# Patient Record
Sex: FEMALE | Race: WHITE | Employment: FULL TIME | URBAN - METROPOLITAN AREA
[De-identification: names, ages, dates, MRNs, and addresses within clinical notes are randomized per-mention and may not be internally consistent; named-entity substitution may affect disease eponyms.]

---

## 2018-11-01 ENCOUNTER — OFFICE VISIT (OUTPATIENT)
Dept: GERIATRIC MEDICINE | Age: 58
End: 2018-11-01

## 2018-11-01 VITALS
RESPIRATION RATE: 18 BRPM | DIASTOLIC BLOOD PRESSURE: 84 MMHG | OXYGEN SATURATION: 100 % | TEMPERATURE: 99.2 F | HEART RATE: 86 BPM | SYSTOLIC BLOOD PRESSURE: 112 MMHG

## 2018-11-01 DIAGNOSIS — E16.2: ICD-10-CM

## 2018-11-01 DIAGNOSIS — Z98.890: ICD-10-CM

## 2018-11-01 DIAGNOSIS — R42 EPISODE OF DIZZINESS: Primary | ICD-10-CM

## 2018-11-01 DIAGNOSIS — F41.9 ANXIETY: ICD-10-CM

## 2018-11-01 DIAGNOSIS — E16.2 HYPOGLYCEMIA: ICD-10-CM

## 2018-11-01 DIAGNOSIS — E86.0 DEHYDRATION: ICD-10-CM

## 2018-11-01 PROBLEM — F41.1 GENERALIZED ANXIETY DISORDER: Chronic | Status: ACTIVE | Noted: 2018-11-01

## 2018-11-01 LAB — GLUCOSE POC: 84 MG/DL

## 2018-11-01 RX ORDER — DEXTROSE 50 % IN WATER (D50W) INTRAVENOUS SYRINGE
25
Qty: 50 ML | Refills: 0
Start: 2018-11-01 | End: 2018-11-01

## 2018-11-01 RX ORDER — CITALOPRAM 10 MG/1
TABLET ORAL DAILY
COMMUNITY

## 2018-11-01 RX ORDER — HYDROGEN PEROXIDE 3 %
SOLUTION, NON-ORAL MISCELLANEOUS DAILY
COMMUNITY

## 2018-11-01 RX ORDER — SODIUM CHLORIDE 9 MG/ML
1000 INJECTION, SOLUTION INTRAVENOUS
Qty: 1000 ML | Refills: 0
Start: 2018-11-01 | End: 2018-11-01

## 2018-11-01 RX ORDER — ESTRADIOL 2 MG/1
TABLET ORAL DAILY
COMMUNITY
End: 2022-09-29

## 2018-11-01 NOTE — PATIENT INSTRUCTIONS
Dehydration: Care Instructions  Your Care Instructions  Dehydration happens when your body loses too much fluid. This might happen when you do not drink enough water or you lose large amounts of fluids from your body because of diarrhea, vomiting, or sweating. Severe dehydration can be life-threatening. Water and minerals called electrolytes help put your body fluids back in balance. Learn the early signs of fluid loss, and drink more fluids to prevent dehydration. Follow-up care is a key part of your treatment and safety. Be sure to make and go to all appointments, and call your doctor if you are having problems. It's also a good idea to know your test results and keep a list of the medicines you take. How can you care for yourself at home? · To prevent dehydration, drink plenty of fluids, enough so that your urine is light yellow or clear like water. Choose water and other caffeine-free clear liquids until you feel better. If you have kidney, heart, or liver disease and have to limit fluids, talk with your doctor before you increase the amount of fluids you drink. · If you do not feel like eating or drinking, try taking small sips of water, sports drinks, or other rehydration drinks. · Get plenty of rest.  To prevent dehydration  · Add more fluids to your diet and daily routine, unless your doctor has told you not to. · During hot weather, drink more fluids. Drink even more fluids if you exercise a lot. Stay away from drinks with alcohol or caffeine. · Watch for the symptoms of dehydration. These include:  ? A dry, sticky mouth. ? Dark yellow urine, and not much of it. ? Dry and sunken eyes. ? Feeling very tired. · Learn what problems can lead to dehydration. These include:  ? Diarrhea, fever, and vomiting. ? Any illness with a fever, such as pneumonia or the flu. ? Activities that cause heavy sweating, such as endurance races and heavy outdoor work in hot or humid weather. ?  Alcohol or drug abuse or withdrawal.  ? Certain medicines, such as cold and allergy pills (antihistamines), diet pills (diuretics), and laxatives. ? Certain diseases, such as diabetes, cancer, and heart or kidney disease. When should you call for help? Call 911 anytime you think you may need emergency care. For example, call if:    · You passed out (lost consciousness).    Call your doctor now or seek immediate medical care if:    · You are confused and cannot think clearly.     · You are dizzy or lightheaded, or you feel like you may faint.     · You have signs of needing more fluids. You have sunken eyes and a dry mouth, and you pass only a little dark urine.     · You cannot keep fluids down.    Watch closely for changes in your health, and be sure to contact your doctor if:    · You are not making tears.     · Your skin is very dry and sags slowly back into place after you pinch it.     · Your mouth and eyes are very dry. Where can you learn more? Go to http://bradley-michelle.info/. Enter E900 in the search box to learn more about \"Dehydration: Care Instructions. \"  Current as of: November 20, 2017  Content Version: 11.8  © 4336-4093 [employer]. Care instructions adapted under license by [employer] (which disclaims liability or warranty for this information). If you have questions about a medical condition or this instruction, always ask your healthcare professional. [place] [address] any warranty or liability for your use of this information. Learning About Low Blood Sugar (Hypoglycemia) in Diabetes  What is low blood sugar (hypoglycemia)? Hypoglycemia means that your blood sugar is low and your body (especially your brain) is not getting enough fuel. If you have diabetes, your blood sugar can go too low if you take too much of some diabetes medicines.  It can also go too low if you miss a meal. And it can happen if you exercise too hard without eating enough food. Some medicines used to treat other health problems can cause low blood sugar too. What are the symptoms? Symptoms of low blood sugar can start quickly. It may take just 10 to 15 minutes. If you have had diabetes for many years, you may not realize that your blood sugar is low until it drops very low. · If your blood sugar level drops below 70 (mild low blood sugar), you may feel tired, anxious, dizzy, weak, shaky, or sweaty. You may have a fast heartbeat or blurry vision. · If your blood sugar level continues to drop (usually below 40), your behavior may change. You may feel more irritable. You may find it hard to concentrate or talk. And you may feel unsteady when you stand or walk. You may become too weak or confused to eat something with sugar to raise your blood sugar level. · If your blood sugar level drops very low (usually below 20), you may pass out (lose consciousness). Or you may have a seizure or stroke. If you have symptoms of severe low blood sugar, you need to get medical care right away. If you had a low blood sugar level during the night, you may wake up tired or with a headache. Or you may sweat so much during the night that your pajamas or sheets are damp when you wake up. How is low blood sugar treated? You can treat low blood sugar by eating or drinking something that has 15 grams of carbohydrate. These should be quick-sugar foods. Check your blood sugar level again 15 minutes after having a quick-sugar food to make sure your level is getting back to your target range.   Here are examples of quick-sugar foods that have 15 grams of carbohydrate:  · 3 to 4 glucose tablets  · 1 tube of glucose gel  · Hard candy (such as 3 Jolly Ranchers or 5 to 7 Life Savers)  · 1 tablespoon honey  · 2 tablespoons of raisins  · ½ cup to ¾ cup (4 to 6 ounces) of fruit juice or regular (not diet) soda  · 1 tablespoon of sugar  · 1 cup of fat-free milk  If you have problems with severe low blood sugar, someone else may have to give you a shot of glucagon. This is a hormone that raises blood sugar levels quickly. How can you prevent low blood sugar? You can take steps to prevent low blood sugar. · Follow your treatment plan. Take your insulin or other diabetes medicine exactly as your doctor prescribed it. Talk with your doctor if you're having low blood sugar often. Your medicine may need to be adjusted if it's causing your low blood sugar. · Check your blood sugar levels often. This helps you find early changes before an emergency happens. · Keep a quick-sugar food with you in case your blood sugar level drops low. · Eat small meals more often so that you don't get too hungry between meals. Don't skip meals. · Balance extra exercise with eating more. Check your blood sugar and learn how it changes after exercise. If your blood sugar stays at a normal level, you may not need to eat after you exercise. · Limit how much alcohol you drink. Alcohol can make low blood sugar go even lower. Don't drink alcohol if you have problems recognizing the early signs of low blood sugar. · Keep a diary of your symptoms. This helps you learn when changes in your body may signal low blood sugar. And keep track of how often you have low blood sugar, including when you last ate and what you ate. This will help you learn what causes your blood sugar to drop. · Learn about diabetes and low blood sugar. Support groups or a diabetes education center can help you understand how medicines, diet, and exercise affect your blood sugar levels. Since low blood sugar levels can quickly become an emergency, be sure to wear medical alert jewelry, such as a medical alert bracelet. This is to let people know you have diabetes so they can get help for you. You can buy this at most drugstores. And make sure your family, friends, and coworkers know the symptoms of low blood sugar.  Teach them what to do to get your sugar level up.  Follow-up care is a key part of your treatment and safety. Be sure to make and go to all appointments, and call your doctor if you are having problems. It's also a good idea to know your test results and keep a list of the medicines you take. Where can you learn more? Go to http://bradley-michelle.info/. Enter U049 in the search box to learn more about \"Learning About Low Blood Sugar (Hypoglycemia) in Diabetes. \"  Current as of: December 7, 2017  Content Version: 11.8  © 4344-3968 [employer]. Care instructions adapted under license by [employer] (which disclaims liability or warranty for this information). If you have questions about a medical condition or this instruction, always ask your healthcare professional. [address] any warranty or liability for your use of this information. Lightheadedness or Faintness: Care Instructions  Your Care Instructions  Lightheadedness is a feeling that you are about to faint or \"pass out. \" You do not feel as if you or your surroundings are moving. It is different from vertigo, which is the feeling that you or things around you are spinning or tilting. Lightheadedness usually goes away or gets better when you lie down. If lightheadedness gets worse, it can lead to a fainting spell. It is common to feel lightheaded from time to time. Lightheadedness usually is not caused by a serious problem. It often is caused by a short-lasting drop in blood pressure and blood flow to your head that occurs when you get up too quickly from a seated or lying position. Follow-up care is a key part of your treatment and safety. Be sure to make and go to all appointments, and call your doctor if you are having problems. It's also a good idea to know your test results and keep a list of the medicines you take. How can you care for yourself at home? · Lie down for 1 or 2 minutes when you feel lightheaded.  After lying down, sit up slowly and remain sitting for 1 to 2 minutes before slowly standing up. · Avoid movements, positions, or activities that have made you lightheaded in the past.  · Get plenty of rest, especially if you have a cold or flu, which can cause lightheadedness. · Make sure you drink plenty of fluids, especially if you have a fever or have been sweating. · Do not drive or put yourself and others in danger while you feel lightheaded. When should you call for help? Call 911 anytime you think you may need emergency care. For example, call if:    · You have symptoms of a stroke. These may include:  ? Sudden numbness, tingling, weakness, or loss of movement in your face, arm, or leg, especially on only one side of your body. ? Sudden vision changes. ? Sudden trouble speaking. ? Sudden confusion or trouble understanding simple statements. ? Sudden problems with walking or balance. ? A sudden, severe headache that is different from past headaches.     · You have symptoms of a heart attack. These may include:  ? Chest pain or pressure, or a strange feeling in the chest.  ? Sweating. ? Shortness of breath. ? Nausea or vomiting. ? Pain, pressure, or a strange feeling in the back, neck, jaw, or upper belly or in one or both shoulders or arms. ? Lightheadedness or sudden weakness. ? A fast or irregular heartbeat. After you call 911, the  may tell you to chew 1 adult-strength or 2 to 4 low-dose aspirin. Wait for an ambulance. Do not try to drive yourself.    Watch closely for changes in your health, and be sure to contact your doctor if:    · Your lightheadedness gets worse or does not get better with home care. Where can you learn more? Go to http://bradley-michelle.info/. Enter V664 in the search box to learn more about \"Lightheadedness or Faintness: Care Instructions. \"  Current as of: November 20, 2017  Content Version: 11.8  © 5526-6596 [employer].  Care instructions adapted under license by [employer] (which disclaims liability or warranty for this information). If you have questions about a medical condition or this instruction, always ask your healthcare professional. [address] any warranty or liability for your use of this information.

## 2018-11-01 NOTE — PROGRESS NOTES
Reason for Visit/HPI:    Ольга Cruz is a 63 y.o. female patient who presents today for   Chief Complaint   Patient presents with    Visual Problems     Patient here for vision changes, She states she had upper and lower GI yesturday. She did receive fluids for dehydration yesturday. This morning she started with the vision changes and feeling of nervous, shaking. Diagnosis/Treatment Plan:    Diagnoses and all orders for this visit:    1. Episode of dizziness  -     0.9% sodium chloride solution; 1,000 mL by IntraVENous route now for 1 dose. -     AMB POC GLUCOSE BLOOD, BY GLUCOSE MONITORING DEVICE  -     IV INFUSION, HYDRATION, 1ST HOUR  -     dextrose (D50W) 50% solution; 50 mL by IntraVENous route now for 1 dose. -     ND THER/PROPH/DIAG INJECTION, IV PUSH, SINGLE  -     INSERT PERIPHERAL IV    2. Hypoglycemia ; Mrs. Kameron Harrington is 1 day post op EGD/COLON procedure with the [facility] for screening colonoscopy and EGD for GERD evaluation. Mrs. Kameron Harrington presents today in a panic response. She is stating \"I feel like I am going to die\", \" I am going to pass out\". She was panicking and hyperventilating when I came into the room. Her vitals were elevated and she was breathing 24 to 28 bpm. I had her lie down and following exam it is noted that her sugar is 84 on our in house monitor. She has been NPO for a few days due the colonoscopy. She also reports she had a rough recovery from the procedures and required extra time and fluids to wake up and be released. As far as she and her  know there were not complications from the procedures. Vitals are stable but elevated. I ordered and placed 18 g IV C in the right AC, started bolus of Normal Saline, gave 1 tube of oral glucagon without response, I then her sugar was still not responding. I pushed Dextrose IV 50% total of 50 mls. Approximately 5-7 min's later her shaking at stopped, she was calm, and making sentences.  500 ml's of normal saline was infused and she was assessed for safe ambulation. She went to the bathroom with her  and had a bowel movement without any signs of blood or trauma as well as she urinated well. She reported her dizziness, tremors, weakness, and panic was resolving well. I advised she should get the other 500 mls for a total 1000 mls for hydration. Vital signs were reacessed after the entire 1000 mls. She was stable on her feet, her tongue was wet and glistening. Rechecked her FS BS noted to be 88 on our monitor. Concered the monitor was not correct to start with. Discharged her home for the day with her  to rest up. Educated on hypoglycemia and the signs and symptoms.     -     0.9% sodium chloride solution; 1,000 mL by IntraVENous route now for 1 dose. -     AMB POC GLUCOSE BLOOD, BY GLUCOSE MONITORING DEVICE  -     IV INFUSION, HYDRATION, 1ST HOUR  -     dextrose (D50W) 50% solution; 50 mL by IntraVENous route now for 1 dose. -     NC THER/PROPH/DIAG INJECTION, IV PUSH, SINGLE  -     INSERT PERIPHERAL IV    3. Dehydration  -     0.9% sodium chloride solution; 1,000 mL by IntraVENous route now for 1 dose. -     AMB POC GLUCOSE BLOOD, BY GLUCOSE MONITORING DEVICE  -     IV INFUSION, HYDRATION, 1ST HOUR  -     dextrose (D50W) 50% solution; 50 mL by IntraVENous route now for 1 dose. -     NC THER/PROPH/DIAG INJECTION, IV PUSH, SINGLE  -     INSERT PERIPHERAL IV    4. Transient hypoglycemia post procedure    5. Anxiety  -     0.9% sodium chloride solution; 1,000 mL by IntraVENous route now for 1 dose. -     AMB POC GLUCOSE BLOOD, BY GLUCOSE MONITORING DEVICE  -     IV INFUSION, HYDRATION, 1ST HOUR  -     dextrose (D50W) 50% solution; 50 mL by IntraVENous route now for 1 dose. -     NC THER/PROPH/DIAG INJECTION, IV PUSH, SINGLE  -     INSERT PERIPHERAL IV    Discontinued Care:     The following treatment modalities have been discontinued by the provider today:   There are no discontinued medications. Follow Up: Follow-up Disposition:  Return if symptoms worsen or fail to improve. Subjective:   No Known Allergies  Prior to Admission medications    Medication Sig Start Date End Date Taking? Authorizing Provider   estradiol (ESTRACE) 2 mg tablet Take  by mouth daily. Yes Provider, Historical   citalopram (CELEXA) 10 mg tablet Take  by mouth daily. Yes Provider, Historical   esomeprazole (NEXIUM) 20 mg capsule Take  by mouth daily. Yes Provider, Historical   0.9% sodium chloride solution 1,000 mL by IntraVENous route now for 1 dose. 11/1/18 11/1/18 Yes Estrella Isaac NP   dextrose (D50W) 50% solution 50 mL by IntraVENous route now for 1 dose. 11/1/18 11/1/18 Yes Estrella Isaac NP     Past Medical History:   Diagnosis Date    GERD (gastroesophageal reflux disease)      Past Surgical History:   Procedure Laterality Date    HX COLONOSCOPY  10/31/2018    HX ENDOSCOPY  10/31/2018    HX HYSTERECTOMY        Social History     Tobacco Use    Smoking status: Never Smoker    Smokeless tobacco: Never Used   Substance Use Topics    Alcohol use: Yes     Alcohol/week: 1.2 oz     Types: 2 Glasses of wine per week     Frequency: Never    Drug use: No      History reviewed. No pertinent family history. No flowsheet data found.   Test Results:   No results found for: WBC, WBCLT, HGBPOC, HGB, HGBP, HCTPOC, HCT, PHCT, RBCH, PLT, MCV, HGBEXT, HCTEXT, PLTEXT  No results found for: NA, K, CL, CO2, AGAP, GLU, BUN, CREA, BUCR, GFRAA, GFRNA, CA, TBIL, TBILI, GPT, SGOT, AP, TP, ALB, GLOB, AGRAT, ALT    Objective:     Vitals:    11/01/18 0957 11/01/18 1003 11/01/18 1045   BP: 121/82 140/90 112/84   Pulse: 88  86   Resp: 22  18   Temp: 98.8 °F (37.1 °C)  99.2 °F (37.3 °C)   TempSrc: Oral  Tympanic   SpO2: 100%  100%     Wt Readings from Last 3 Encounters:   No data found for Wt     BP Readings from Last 3 Encounters:   11/01/18 112/84     Review of Systems   Constitutional: Positive for activity change, appetite change, chills and fatigue. Negative for fever. HENT: Positive for trouble swallowing. Negative for congestion, dental problem, hearing loss, postnasal drip, sinus pressure, sneezing and sore throat. Eyes: Positive for photophobia and visual disturbance. Negative for discharge and redness. Respiratory: Positive for chest tightness and shortness of breath. Negative for apnea, cough and wheezing. Cardiovascular: Positive for chest pain and palpitations. Negative for leg swelling. Gastrointestinal: Negative for abdominal distention, abdominal pain, blood in stool, constipation, diarrhea, nausea and vomiting. Endocrine: Negative for polydipsia, polyphagia and polyuria. Genitourinary: Negative for flank pain, frequency and urgency. Musculoskeletal: Negative for arthralgias, gait problem, joint swelling and neck pain. Skin: Positive for pallor. Negative for color change, rash and wound. Allergic/Immunologic: Negative for environmental allergies and food allergies. Neurological: Positive for dizziness, tremors, weakness, light-headedness, numbness and headaches. Hematological: Negative for adenopathy. Psychiatric/Behavioral: Positive for agitation and confusion. Negative for sleep disturbance. The patient is nervous/anxious and is hyperactive. Physical Exam   Constitutional: She is oriented to person, place, and time. She appears dehydrated. She appears to not be writhing in pain and not malnourished. She appears unhealthy. She does not have a sickly appearance. She appears distressed. Alert, frantic, panicked, hyperventilating, anxious, making statements of \"impending doom\", \"I am going to pass out\". HENT:   Head: Normocephalic and atraumatic.    Right Ear: Hearing, tympanic membrane, external ear and ear canal normal.   Left Ear: Hearing, tympanic membrane, external ear and ear canal normal.   Nose: Nose normal.   Mouth/Throat: Uvula is midline and oropharynx is clear and moist. Mucous membranes are not pale, dry and not cyanotic. No oral lesions. No uvula swelling. No posterior oropharyngeal edema or posterior oropharyngeal erythema. Dry, furrowed, tachy. Eyes: Conjunctivae, EOM and lids are normal. Pupils are equal, round, and reactive to light. Neck: Trachea normal, normal range of motion and full passive range of motion without pain. Neck supple. Normal carotid pulses, no hepatojugular reflux and no JVD present. No thyromegaly present. Cardiovascular: S1 normal, S2 normal, normal heart sounds, intact distal pulses and normal pulses. An irregular rhythm present. Occasional extrasystoles are present. Tachycardia present. Exam reveals no gallop and no friction rub. No murmur heard. 1 + non pitting edema in both lower extremities. Pulmonary/Chest: Effort normal and breath sounds normal. Tachypnea noted. Breathing fast, irregular, and almost hyperventilating. Abdominal: Soft. Normal appearance and bowel sounds are normal. There is no hepatosplenomegaly. There is no tenderness. There is no CVA tenderness. Musculoskeletal:   Generalized chronic extremity weakness is present. Lymphadenopathy:        Head (right side): Submental, submandibular and tonsillar adenopathy present. Head (left side): Submental, submandibular and tonsillar adenopathy present. Neurological: She is alert and oriented to person, place, and time. She has normal reflexes and intact cranial nerves. She is agitated. She displays weakness, abnormal stance and abnormal speech. A sensory deficit is present. She exhibits normal muscle tone. Coordination and gait abnormal. GCS score is 15. Skin: Skin is warm and intact. No bruising and no rash noted. She is diaphoretic. No cyanosis. There is pallor. Nails show no clubbing. Psychiatric: Memory normal. Her mood appears anxious. She is agitated. She expresses impulsivity. She exhibits disordered thought content.    Baseline mood and affect unchanged from normal.       Disclaimer:   Ms. Susannah Nelson has been advised to call or return to our office if symptoms worsen/change/persist. We as a care team including the patient; discussed expected course/resolution/complications of diagnosis in detail today. Medication risks/benefits/costs/interactions/alternatives discussed Susannah Nelson was given an after visit summary which includes diagnoses, current medications, & vitals. Susannah Nelson expressed understanding with the diagnosis and plan.

## 2018-11-01 NOTE — PROGRESS NOTES
ADVISED PATIENT OF THE FOLLOWING HEALTH MAINTAINCE DUE  Health Maintenance Due   Topic Date Due    DTaP/Tdap/Td series (1 - Tdap) 05/24/1982    PAP AKA CERVICAL CYTOLOGY  05/24/1982    Shingrix Vaccine Age 50> (1 of 2) 05/24/2011    BREAST CANCER SCRN MAMMOGRAM  05/24/2011    FOBT Q 1 YEAR AGE 50-75  05/24/2011    Influenza Age 5 to Adult  08/01/2018      Chief Complaint   Patient presents with    Visual Problems     Patient here for vision changes, She states she had upper and lower GI yesturday. She did receive fluids for dehydration yesturday. This morning she started with the vision changes and feeling of nervous, shaking. 1. Have you been to the ER, urgent care clinic since your last visit? Hospitalized since your last visit? No    2. Have you seen or consulted any other health care providers outside of the [address] [place] [address] since your last visit? Include any DEXA scan, mammography  or colon screening. Yes, had upper and lower GI on yesturday. 10/31/18    3. Do you have an Advance Directive on file? no    4. Do you have a DNR on file?  Full        Patient is accompanied by self and  I have received verbal consent from Sivan Watters to discuss any/all medical information while they are present in the [facility]

## 2018-12-17 ENCOUNTER — OFFICE VISIT (OUTPATIENT)
Dept: GERIATRIC MEDICINE | Age: 58
End: 2018-12-17

## 2018-12-17 VITALS
OXYGEN SATURATION: 100 % | RESPIRATION RATE: 18 BRPM | DIASTOLIC BLOOD PRESSURE: 74 MMHG | SYSTOLIC BLOOD PRESSURE: 112 MMHG | WEIGHT: 150 LBS | HEART RATE: 83 BPM | BODY MASS INDEX: 24.99 KG/M2 | HEIGHT: 65 IN

## 2018-12-17 DIAGNOSIS — R19.8 ABDOMINAL FULLNESS IN LEFT LOWER QUADRANT: ICD-10-CM

## 2018-12-17 DIAGNOSIS — E34.9 NON-MENOPAUSE HORMONAL DISORDER IN FEMALE: Chronic | ICD-10-CM

## 2018-12-17 DIAGNOSIS — N95.2 ATROPHIC VAGINITIS: Chronic | ICD-10-CM

## 2018-12-17 DIAGNOSIS — N32.89 BLADDER IRRITABILITY: ICD-10-CM

## 2018-12-17 DIAGNOSIS — N39.0 CHRONIC UTI (URINARY TRACT INFECTION): ICD-10-CM

## 2018-12-17 DIAGNOSIS — R35.0 URINARY FREQUENCY: Primary | ICD-10-CM

## 2018-12-17 PROBLEM — K58.2 IRRITABLE BOWEL SYNDROME WITH BOTH CONSTIPATION AND DIARRHEA: Chronic | Status: ACTIVE | Noted: 2018-12-17

## 2018-12-17 LAB
BILIRUB UR QL STRIP: NEGATIVE
GLUCOSE UR-MCNC: NEGATIVE MG/DL
KETONES P FAST UR STRIP-MCNC: NEGATIVE MG/DL
PH UR STRIP: 6 [PH] (ref 4.6–8)
PROT UR QL STRIP: NORMAL
SP GR UR STRIP: 1 (ref 1–1.03)
UA UROBILINOGEN AMB POC: NORMAL (ref 0.2–1)
URINALYSIS CLARITY POC: CLEAR
URINALYSIS COLOR POC: COLORLESS
URINE BLOOD POC: NEGATIVE
URINE LEUKOCYTES POC: NEGATIVE
URINE NITRITES POC: NEGATIVE

## 2018-12-17 RX ORDER — PHENAZOPYRIDINE HYDROCHLORIDE 100 MG/1
100 TABLET, FILM COATED ORAL
Qty: 9 TAB | Refills: 0 | Status: SHIPPED | OUTPATIENT
Start: 2018-12-17 | End: 2018-12-26

## 2018-12-17 RX ORDER — ESTRADIOL 10 UG/1
10 INSERT VAGINAL 2 TIMES WEEKLY
COMMUNITY
End: 2019-12-24

## 2018-12-17 RX ORDER — LORAZEPAM 0.5 MG/1
0.5 TABLET ORAL
Refills: 0 | COMMUNITY
Start: 2018-11-02 | End: 2018-12-27 | Stop reason: ALTCHOICE

## 2018-12-17 NOTE — PROGRESS NOTES
Reason for Visit/HPI:    Marcella Addison is a 63 y.o. female patient who presents today for   Chief Complaint   Patient presents with    Urinary Frequency     Being seen for UTI, on ABT about a month ago, now having frequency symptoms again. Diagnosis/Treatment Plan:    Diagnoses and all orders for this visit:    1. Urinary frequency  Comments:  Reports this has been ongoing for the last few months to a year. She has been on HRT for years. She just finished a course of ABX a month ago. Symptoms are back  Orders:  -     AMB POC URINALYSIS DIP STICK MANUAL W/O MICRO  -     conjugated estrogens (PREMARIN) 0.625 mg/gram vaginal cream; Apply 0.5 g to affected area daily.  -     UA/M W/RFLX CULTURE, ROUTINE  -     REFERRAL TO GYNECOLOGY  -     phenazopyridine (PYRIDIUM) 100 mg tablet; Take 1 Tab by mouth three (3) times daily as needed for Pain for up to 9 days. 2. Bladder irritability  Comments:  Chronic, ongoing bladder fullness, urgency, spasms. Orders:  -     REFERRAL TO GYNECOLOGY  -     phenazopyridine (PYRIDIUM) 100 mg tablet; Take 1 Tab by mouth three (3) times daily as needed for Pain for up to 9 days. 3. Abdominal fullness in left lower quadrant  Comments:  Mild lower left quad pain with these urinary frequencies. Orders:  -     conjugated estrogens (PREMARIN) 0.625 mg/gram vaginal cream; Apply 0.5 g to affected area daily.  -     UA/M W/RFLX CULTURE, ROUTINE  -     REFERRAL TO GYNECOLOGY  -     phenazopyridine (PYRIDIUM) 100 mg tablet; Take 1 Tab by mouth three (3) times daily as needed for Pain for up to 9 days. 4. Atrophic vaginitis  Comments:  H/o due to early hysterectomy in her 20's   Orders:  -     conjugated estrogens (PREMARIN) 0.625 mg/gram vaginal cream; Apply 0.5 g to affected area daily.  -     UA/M W/RFLX CULTURE, ROUTINE  -     REFERRAL TO GYNECOLOGY  -     phenazopyridine (PYRIDIUM) 100 mg tablet;  Take 1 Tab by mouth three (3) times daily as needed for Pain for up to 9 days.    5. Chronic UTI (urinary tract infection)  Comments:  On & off for the last months to a year. Has been treated in the last month with Macrobid. Symptoms are back minus burning this time. Orders:  -     REFERRAL TO GYNECOLOGY  -     phenazopyridine (PYRIDIUM) 100 mg tablet; Take 1 Tab by mouth three (3) times daily as needed for Pain for up to 9 days. 6. 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 HRT for years. Orders:  -     REFERRAL TO GYNECOLOGY  -     phenazopyridine (PYRIDIUM) 100 mg tablet; Take 1 Tab by mouth three (3) times daily as needed for Pain for up to 9 days. Discontinued Care: The following treatment modalities have been discontinued by the provider today:   There are no discontinued medications. Follow Up: Follow-up Disposition:  Return in about 1 week (around 12/24/2018), or if symptoms worsen or fail to improve. Subjective:   No Known Allergies  Prior to Admission medications    Medication Sig Start Date End Date Taking? Authorizing Provider   LORazepam (ATIVAN) 0.5 mg tablet Take 0.5 mg by mouth two (2) times daily as needed. 11/2/18  Yes Provider, Historical   estradiol (VAGIFEM) 10 mcg tab vaginal tablet Insert 10 mcg into vagina two (2) times a week. Yes Provider, Historical   conjugated estrogens (PREMARIN) 0.625 mg/gram vaginal cream Apply 0.5 g to affected area daily. 12/17/18  Yes Alana Webb NP   phenazopyridine (PYRIDIUM) 100 mg tablet Take 1 Tab by mouth three (3) times daily as needed for Pain for up to 9 days. 12/17/18 12/26/18 Yes Alana Webb, JUAN   estradiol (ESTRACE) 2 mg tablet Take  by mouth daily. Yes Provider, Historical   citalopram (CELEXA) 10 mg tablet Take  by mouth daily. Yes Provider, Historical   esomeprazole (NEXIUM) 20 mg capsule Take  by mouth daily.    Yes Provider, Historical     Past Medical History:   Diagnosis Date    GERD (gastroesophageal reflux disease)      Past Surgical History:   Procedure Laterality Date    HX COLONOSCOPY  10/31/2018    HX ENDOSCOPY  10/31/2018    HX HYSTERECTOMY        Social History     Tobacco Use    Smoking status: Never Smoker    Smokeless tobacco: Never Used   Substance Use Topics    Alcohol use: Yes     Alcohol/week: 1.2 oz     Types: 2 Glasses of wine per week     Frequency: Never    Drug use: No      No family history on file. Advance Care Planning 12/17/2018   Patient's Healthcare Decision Maker is: Verbal statement (Legal Next of Kin remains as decision maker)   Confirm Advance Directive None   Patient Would Like to Complete Advance Directive No     Test Results:     Office Visit on 12/17/2018   Component Date Value Ref Range Status    Color (UA POC) 12/17/2018 Colorless   Final    Clarity (UA POC) 12/17/2018 Clear   Final    Glucose (UA POC) 12/17/2018 Negative  Negative Final    Bilirubin (UA POC) 12/17/2018 Negative  Negative Final    Ketones (UA POC) 12/17/2018 Negative  Negative Final    Specific gravity (UA POC) 12/17/2018 1.005  1.001 - 1.035 Final    Blood (UA POC) 12/17/2018 Negative  Negative Final    pH (UA POC) 12/17/2018 6.0  4.6 - 8.0 Final    Protein (UA POC) 12/17/2018 1+  Negative Final    Urobilinogen (UA POC) 12/17/2018 normal  0.2 - 1 Final    Nitrites (UA POC) 12/17/2018 Negative  Negative Final    Leukocyte esterase (UA POC) 12/17/2018 Negative  Negative Final   Office Visit on 11/01/2018   Component Date Value Ref Range Status    Glucose POC 11/01/2018 84  mg/dL Final       Objective:     Vitals:    12/17/18 1533   BP: 112/74   Pulse: 83   Resp: 18   SpO2: 100%   Weight: 150 lb (68 kg)   Height: 5' 5\" (1.651 m)     Wt Readings from Last 3 Encounters:   12/17/18 150 lb (68 kg)     BP Readings from Last 3 Encounters:   12/17/18 112/74   11/01/18 112/84     Review of Systems   Constitutional: Negative for activity change, appetite change, chills, fatigue and fever.    HENT: Negative for congestion, dental problem, hearing loss, postnasal drip, sinus pressure, sneezing, sore throat and trouble swallowing. Eyes: Negative for discharge, redness and visual disturbance. Respiratory: Negative for apnea, cough, chest tightness, shortness of breath and wheezing. Cardiovascular: Negative for chest pain, palpitations and leg swelling. Gastrointestinal: Negative for abdominal distention, abdominal pain, blood in stool, constipation, diarrhea, nausea and vomiting. Endocrine: Positive for polyuria. Negative for polydipsia and polyphagia. Genitourinary: Positive for difficulty urinating, dysuria, flank pain, frequency, urgency and vaginal pain. Musculoskeletal: Negative for arthralgias, gait problem, joint swelling and neck pain. Skin: Negative for color change, pallor, rash and wound. Allergic/Immunologic: Negative for environmental allergies and food allergies. Neurological: Negative for dizziness, tremors, weakness, light-headedness, numbness and headaches. Hematological: Negative for adenopathy. Psychiatric/Behavioral: Negative for agitation, confusion and sleep disturbance. The patient is not nervous/anxious. Physical Exam   Constitutional: She is oriented to person, place, and time and well-developed, well-nourished, and in no distress. Vital signs are normal. She appears to not be writhing in pain, not malnourished and not dehydrated. She appears healthy. She does not have a sickly appearance. No distress. HENT:   Head: Normocephalic and atraumatic. Right Ear: Hearing, tympanic membrane, external ear and ear canal normal.   Left Ear: Hearing, tympanic membrane, external ear and ear canal normal.   Nose: Nose normal.   Mouth/Throat: Uvula is midline, oropharynx is clear and moist and mucous membranes are normal. Mucous membranes are not pale, not dry and not cyanotic. No oral lesions. No uvula swelling. No posterior oropharyngeal edema or posterior oropharyngeal erythema.    Eyes: Conjunctivae, EOM and lids are normal. Pupils are equal, round, and reactive to light. Lids are everted and swept, no foreign bodies found. Neck: Trachea normal, normal range of motion and full passive range of motion without pain. Neck supple. No hepatojugular reflux and no JVD present. Carotid bruit is not present. No thyromegaly present. Cardiovascular: Normal rate, regular rhythm, S1 normal, S2 normal, normal heart sounds, intact distal pulses and normal pulses. Occasional extrasystoles are present. Exam reveals no gallop and no friction rub. No murmur heard. No extremity edema is present during today's exam.    Pulmonary/Chest: Effort normal and breath sounds normal.   Abdominal: Soft. Normal appearance and bowel sounds are normal. There is no hepatosplenomegaly. There is no tenderness. There is no CVA tenderness. Neurological: She is alert and oriented to person, place, and time. She has normal sensation, normal strength, normal reflexes and intact cranial nerves. She displays no weakness. She exhibits normal muscle tone. Gait normal. Coordination and gait normal. GCS score is 15. Skin: Skin is warm, dry and intact. No bruising and no rash noted. She is not diaphoretic. No cyanosis. No pallor. Nails show no clubbing. Psychiatric: Mood, memory, affect and judgment normal.   Baseline mood and affect unchanged from normal.    Nursing note and vitals reviewed. Disclaimer:   Ms. Linda Philippe has been advised to call or return to our office if symptoms worsen/change/persist. We as a care team including the patient; discussed expected course/resolution/complications of diagnosis in detail today. Medication risks/benefits/costs/interactions/alternatives discussed Linda Philippe was given an after visit summary which includes diagnoses, current medications, & vitals. Linda Philippe expressed understanding with the diagnosis and plan.

## 2018-12-17 NOTE — PATIENT INSTRUCTIONS
Hormone Therapy (HT): Care Instructions  Your Care Instructions    Hormone therapy (HT) is medicine to treat symptoms of menopause, such as hot flashes, vaginal dryness, and sleep problems. It replaces the hormones that drop at menopause. Most women get relief from these symptoms within weeks of starting HT. HT contains two female hormones, estrogen and progestin. HT may come in the form of a pill, patch, gel, spray, or vaginal ring. A vaginal cream or a vaginal ring that has a much lower dose of estrogen may be used to relieve vaginal dryness only. HT has some risks. Most doctors recommend that women only take HT for as short a time as possible. This is to reduce the chances of heart disease, breast cancer, blood clots, and stroke that may be connected to HT. Be sure to have regular checkups with your doctor when taking HT. Talk with your doctor about whether HT is right for you. If you decide that the benefits of HT outweigh the risks, ask your doctor to prescribe the lowest effective dose for as short a time as possible. Follow-up care is a key part of your treatment and safety. Be sure to make and go to all appointments, and call your doctor if you are having problems. It's also a good idea to know your test results and keep a list of the medicines you take. Why might you take HT?  · HT reduces symptoms of menopause. These include hot flashes, mood swings, and sleep problems. · The estrogen in HT helps to prevent thinning bones. And it may lower the chance of colon cancer. · HT helps keep the lining of the vagina moist and thick. This can reduce irritation. · HT helps protect against dental problems, such as tooth loss and gum disease. What are the risks of taking HT? · Some women who take HT may have vaginal bleeding, bloating, nausea, sore breasts, mood swings, and headaches. Talk to your doctor about changing the type of HT you take or lowering the dose.  This may help to end these side effects. · Taking HT may slightly increase your risk for heart disease, breast cancer, ovarian cancer, blood clots, and stroke. · You should not take HT if you:  ? Could be pregnant. ? Have a personal history of breast cancer, endometrial cancer, pulmonary embolism, deep vein thrombosis, heart attack, or stroke. ? Have vaginal bleeding from an unknown cause. ? Have active liver disease. What can you do to reduce the symptoms of menopause? · Eat healthy foods and get regular exercise. This also will help to maintain strong bones and a healthy heart. · Do not smoke. If you smoke, you can reduce hot flashes and long-term health risks by stopping. If you need help quitting, talk to your doctor about stop-smoking programs and medicines. These can increase your chances of quitting for good. · Practice daily breathing exercises (meditation) to reduce hot flashes and mood swings. · Limit the amount of alcohol you drink. This can reduce symptoms of menopause and long-term health risks. · Keep your home and office cool. · Use a vaginal lubricant, such as Astroglide, Wet Gel Lubricant, or K-Y Jelly. · Do pelvic floor (Kegel) exercises, which tighten and strengthen pelvic muscles. To do Kegel exercises:  ? Squeeze the same muscles you would use to stop your urine. Your belly and thighs should not move. ? Hold the squeeze for 3 seconds, then relax for 3 seconds. ? Start with 3 seconds. Then add 1 second each week until you are able to squeeze for 10 seconds. ? Repeat the exercise 10 to 15 times a session. Do three or more sessions a day. Where can you learn more? Go to http://bradley-michelle.info/. Enter 002 [id number] in the search box to learn more about \"Hormone Therapy (HT): Care Instructions. \"  Current as of: May 15, 2018  Content Version: 11.8  © 7630-3016 [employer].  Care instructions adapted under license by [employer] (which disclaims liability or warranty for this information). If you have questions about a medical condition or this instruction, always ask your healthcare professional. [place] [address] any warranty or liability for your use of this information.

## 2018-12-17 NOTE — PROGRESS NOTES
ADVISED PATIENT OF THE FOLLOWING HEALTH MAINTAINCE DUE  Health Maintenance Due   Topic Date Due    Hepatitis C Screening  1960    DTaP/Tdap/Td series (1 - Tdap) 05/24/1982    PAP AKA CERVICAL CYTOLOGY  05/24/1982    Shingrix Vaccine Age 50> (1 of 2) 05/24/2011    BREAST CANCER SCRN MAMMOGRAM  05/24/2011    FOBT Q 1 YEAR AGE 50-75  05/24/2011      Chief Complaint   Patient presents with    Urinary Frequency     Being seen for UTI, on ABT about a month ago, now having frequency symptoms again. 1. Have you been to the ER, urgent care clinic since your last visit? Hospitalized since your last visit? Patient sees non [facility] providers. 2. Have you seen or consulted any other health care providers outside of the [address] [place] [address] since your last visit? Include any DEXA scan, mammography  or colon screening. Patient sees non [facility]. 3. Do you have an Advance Directive on file? no    4. Do you have a DNR on file? Full        Patient is accompanied by self I have received verbal consent from Alexandria Nicole to discuss any/all medical information while they are present in the room.   Advance Care Planning 12/17/2018   Patient's Healthcare Decision Maker is: Verbal statement (Legal Next of Kin remains as decision maker)   Confirm Advance Directive None   Patient Would Like to Complete Advance Directive No         [facility] [phone] - Hilaria Serna AT [address] [place] [id number]  Phone: [phone] Fax: [phone]    Results for orders placed or performed in visit on 12/17/18   AMB POC URINALYSIS DIP STICK MANUAL W/O MICRO   Result Value Ref Range    Color (UA POC) Colorless     Clarity (UA POC) Clear     Glucose (UA POC) Negative Negative    Bilirubin (UA POC) Negative Negative    Ketones (UA POC) Negative Negative    Specific gravity (UA POC) 1.005 1.001 - 1.035    Blood (UA POC) Negative Negative pH (UA POC) 6.0 4.6 - 8.0    Protein (UA POC) 1+ Negative    Urobilinogen (UA POC) normal 0.2 - 1    Nitrites (UA POC) Negative Negative    Leukocyte esterase (UA POC) Negative Negative

## 2018-12-19 ENCOUNTER — TELEPHONE (OUTPATIENT)
Dept: GERIATRIC MEDICINE | Age: 58
End: 2018-12-19

## 2018-12-19 DIAGNOSIS — N95.2 ATROPHIC VAGINITIS: Primary | Chronic | ICD-10-CM

## 2018-12-19 DIAGNOSIS — N30.00 ACUTE CYSTITIS WITHOUT HEMATURIA: ICD-10-CM

## 2018-12-19 RX ORDER — NITROFURANTOIN 25; 75 MG/1; MG/1
100 CAPSULE ORAL 2 TIMES DAILY
Qty: 20 CAP | Refills: 0 | Status: SHIPPED | OUTPATIENT
Start: 2018-12-19 | End: 2019-12-03

## 2018-12-19 NOTE — TELEPHONE ENCOUNTER
Mrs. Celio Calvert called to say her symptoms have gotten worse and now she is up going to bathroom hour after hour. Her urine sample has reflexed to a culture. I will order Medardo Jarquin for her to help with the symptoms. If the culture comes back with something else to treat I will change and advise. She has an appt with Dr. Guadarrama Courser for Jan. 9th. She also let me know she had not started the estrogen cream as it was out of stock.

## 2018-12-20 LAB
APPEARANCE UR: CLEAR
BACTERIA #/AREA URNS HPF: NORMAL /[HPF]
BACTERIA UR CULT: ABNORMAL
BILIRUB UR QL STRIP: NEGATIVE
CASTS URNS QL MICRO: NORMAL /LPF
COLOR UR: YELLOW
EPI CELLS #/AREA URNS HPF: NORMAL /HPF
GLUCOSE UR QL: NEGATIVE
HGB UR QL STRIP: NEGATIVE
KETONES UR QL STRIP: NEGATIVE
LEUKOCYTE ESTERASE UR QL STRIP: ABNORMAL
MICRO URNS: ABNORMAL
MUCOUS THREADS URNS QL MICRO: PRESENT
NITRITE UR QL STRIP: NEGATIVE
PH UR STRIP: 6.5 [PH] (ref 5–7.5)
PROT UR QL STRIP: NEGATIVE
RBC #/AREA URNS HPF: NORMAL /HPF
SP GR UR: 1.01 (ref 1–1.03)
URINALYSIS REFLEX, 377202: ABNORMAL
UROBILINOGEN UR STRIP-MCNC: 0.2 MG/DL (ref 0.2–1)
WBC #/AREA URNS HPF: NORMAL /HPF

## 2018-12-21 ENCOUNTER — CLINICAL SUPPORT (OUTPATIENT)
Dept: GERIATRIC MEDICINE | Age: 58
End: 2018-12-21

## 2018-12-21 DIAGNOSIS — B96.20 E-COLI UTI: ICD-10-CM

## 2018-12-21 DIAGNOSIS — B96.20 E-COLI UTI: Primary | ICD-10-CM

## 2018-12-21 DIAGNOSIS — R19.8 ABDOMINAL FULLNESS IN LEFT LOWER QUADRANT: ICD-10-CM

## 2018-12-21 DIAGNOSIS — R10.2 PELVIC PAIN IN FEMALE: ICD-10-CM

## 2018-12-21 DIAGNOSIS — N32.89 BLADDER IRRITABILITY: Primary | ICD-10-CM

## 2018-12-21 DIAGNOSIS — N39.0 E-COLI UTI: ICD-10-CM

## 2018-12-21 DIAGNOSIS — N39.0 CHRONIC UTI (URINARY TRACT INFECTION): ICD-10-CM

## 2018-12-21 DIAGNOSIS — M54.50 ACUTE BILATERAL LOW BACK PAIN WITHOUT SCIATICA: ICD-10-CM

## 2018-12-21 DIAGNOSIS — N39.0 E-COLI UTI: Primary | ICD-10-CM

## 2018-12-21 RX ORDER — ALBUTEROL SULFATE 90 UG/1
AEROSOL, METERED RESPIRATORY (INHALATION)
COMMUNITY
Start: 2018-03-27 | End: 2019-03-27

## 2018-12-21 RX ORDER — CEPHALEXIN 500 MG/1
500 CAPSULE ORAL 2 TIMES DAILY
Qty: 20 CAP | Refills: 0 | Status: SHIPPED | OUTPATIENT
Start: 2018-12-21 | End: 2018-12-31

## 2018-12-21 NOTE — PROGRESS NOTES
Discussed with Joann Roger Strong. Her symptoms are not getting better currently. I have asked for her to complete antibiotics with the additon of Cephalexin daily. She is on the probiotic and we will get an ultrasound to check for any rectal fissures or rectocele involving her bowels & urine.

## 2018-12-21 NOTE — PROGRESS NOTES
Chief Complaint   Patient presents with   [facility]     Patient being seen for lab draw.      Becky Singh presents for lab draw ordered by Dorita Moser RN MSN [facility]-AG-NP    The following labs were drawn and sent to lab by Dylan Sales LPN:    CBC, CMP, IAP2E and Lipid panel, Vitamin B12 folate, Vitamin D, TSH reflex T4    The following tubes were sent:    Lavender  ( 2) and Tiger (2)    Patient tolerated procedure well, blood obtained via venipuncture to left antecubital

## 2018-12-22 LAB
25(OH)D3+25(OH)D2 SERPL-MCNC: 28.2 NG/ML (ref 30–100)
ALBUMIN SERPL-MCNC: 4.2 G/DL (ref 3.5–5.5)
ALBUMIN/GLOB SERPL: 1.6 {RATIO} (ref 1.2–2.2)
ALP SERPL-CCNC: 62 IU/L (ref 39–117)
ALT SERPL-CCNC: 16 IU/L (ref 0–32)
AST SERPL-CCNC: 28 IU/L (ref 0–40)
BASOPHILS # BLD AUTO: 0 X10E3/UL (ref 0–0.2)
BASOPHILS NFR BLD AUTO: 1 %
BILIRUB SERPL-MCNC: <0.2 MG/DL (ref 0–1.2)
BUN SERPL-MCNC: 15 MG/DL (ref 6–24)
BUN/CREAT SERPL: 19 (ref 9–23)
CALCIUM SERPL-MCNC: 9.2 MG/DL (ref 8.7–10.2)
CHLORIDE SERPL-SCNC: 103 MMOL/L (ref 96–106)
CHOLEST SERPL-MCNC: 238 MG/DL (ref 100–199)
CO2 SERPL-SCNC: 24 MMOL/L (ref 20–29)
CREAT SERPL-MCNC: 0.79 MG/DL (ref 0.57–1)
EOSINOPHIL # BLD AUTO: 0.2 X10E3/UL (ref 0–0.4)
EOSINOPHIL NFR BLD AUTO: 3 %
ERYTHROCYTE [DISTWIDTH] IN BLOOD BY AUTOMATED COUNT: 13 % (ref 12.3–15.4)
EST. AVERAGE GLUCOSE BLD GHB EST-MCNC: 97 MG/DL
FOLATE SERPL-MCNC: 6.8 NG/ML
GLOBULIN SER CALC-MCNC: 2.6 G/DL (ref 1.5–4.5)
GLUCOSE SERPL-MCNC: 99 MG/DL (ref 65–99)
HBA1C MFR BLD: 5 % (ref 4.8–5.6)
HCT VFR BLD AUTO: 37.4 % (ref 34–46.6)
HDLC SERPL-MCNC: 73 MG/DL
HGB BLD-MCNC: 12.6 G/DL (ref 11.1–15.9)
IMM GRANULOCYTES # BLD: 0 X10E3/UL (ref 0–0.1)
IMM GRANULOCYTES NFR BLD: 0 %
LDLC SERPL CALC-MCNC: 110 MG/DL (ref 0–99)
LYMPHOCYTES # BLD AUTO: 1 X10E3/UL (ref 0.7–3.1)
LYMPHOCYTES NFR BLD AUTO: 22 %
MCH RBC QN AUTO: 29.6 PG (ref 26.6–33)
MCHC RBC AUTO-ENTMCNC: 33.7 G/DL (ref 31.5–35.7)
MCV RBC AUTO: 88 FL (ref 79–97)
MONOCYTES # BLD AUTO: 0.3 X10E3/UL (ref 0.1–0.9)
MONOCYTES NFR BLD AUTO: 6 %
NEUTROPHILS # BLD AUTO: 3.2 X10E3/UL (ref 1.4–7)
NEUTROPHILS NFR BLD AUTO: 68 %
PLATELET # BLD AUTO: 346 X10E3/UL (ref 150–379)
POTASSIUM SERPL-SCNC: 4.5 MMOL/L (ref 3.5–5.2)
PROT SERPL-MCNC: 6.8 G/DL (ref 6–8.5)
RBC # BLD AUTO: 4.25 X10E6/UL (ref 3.77–5.28)
SODIUM SERPL-SCNC: 139 MMOL/L (ref 134–144)
TRIGL SERPL-MCNC: 274 MG/DL (ref 0–149)
TSH SERPL DL<=0.005 MIU/L-ACNC: 2.14 UIU/ML (ref 0.45–4.5)
VIT B12 SERPL-MCNC: 451 PG/ML (ref 232–1245)
VLDLC SERPL CALC-MCNC: 55 MG/DL (ref 5–40)
WBC # BLD AUTO: 4.7 X10E3/UL (ref 3.4–10.8)

## 2018-12-24 NOTE — PROGRESS NOTES
Reason for Visit/HPI:    Kathy Bullard is a 63 y.o. female patient who presents today for   Chief Complaint   Patient presents with   [facility]     Patient being seen for lab draw.  Urinary Burning     Patient states she is still having alot of pressure in her lower abdomen. Discussed plan of action to figure out these chronic, frequent UTI. Diagnosis/Treatment Plan:      I have discussed with Mrs. Lazara Hill getting an ultrasound to see if there is some connection with the bladder and her bowels. She is also going to see dr. Audrey Anguiano with [address] [place], [place] [address]ary / Radha Dhillon. Problem List Items Addressed This Visit     None      Visit Diagnoses     Bladder irritability    -  Primary    Ongoing with 3 days worth of Macrobid. Per culture she has reoccurant E-coli UTI. Chronic UTI (urinary tract infection)        Chronic in nature. She states she continues to grow out E-coli in her urine. She is seeing Dr. Jeffery Rader the first week of Jan. 2019. Abdominal fullness in left lower quadrant        Ongoing. Not any better with the [address] E-coli UTI        Finishing Macrobid but I am adding Keflex in for 10 days per the culture results. Discontinued Care: The following treatment modalities have been discontinued by the provider today:   There are no discontinued medications. Follow Up: Follow-up Disposition:  Return in about 1 week (around 12/28/2018). Subjective:   No Known Allergies  Prior to Admission medications    Medication Sig Start Date End Date Taking? Authorizing Provider   albuterol (VENTOLIN HFA) 90 mcg/actuation inhaler 1-2 inhalations every 4-6 hours as needed for wheezing. Dispense spacer as needed. 3/27/18 3/27/19  Provider, Historical   cephALEXin (KEFLEX) 500 mg capsule Take 1 Cap by mouth two (2) times a day for 10 days. 12/21/18 12/31/18  Erich Lot, NP   cran-vitC-mannose-FOS-bromeln 6,601-951 mg/15 mL liqd Use as directed.  12/21/18   Erich Lot, NP   nitrofurantoin, macrocrystal-monohydrate, (MACROBID) 100 mg capsule Take 1 Cap by mouth two (2) times a day. 12/19/18   Ean Moreland NP   LORazepam (ATIVAN) 0.5 mg tablet Take 0.5 mg by mouth two (2) times daily as needed. 11/2/18   Provider, Historical   estradiol (VAGIFEM) 10 mcg tab vaginal tablet Insert 10 mcg into vagina two (2) times a week. Provider, Historical   conjugated estrogens (PREMARIN) 0.625 mg/gram vaginal cream Apply 0.5 g to affected area daily. 12/17/18   Ean Moreland NP   phenazopyridine (PYRIDIUM) 100 mg tablet Take 1 Tab by mouth three (3) times daily as needed for Pain for up to 9 days. 12/17/18 12/26/18  Ean Moreland NP   estradiol (ESTRACE) 2 mg tablet Take  by mouth daily. Provider, Historical   citalopram (CELEXA) 10 mg tablet Take  by mouth daily. Provider, Historical   esomeprazole (NEXIUM) 20 mg capsule Take  by mouth daily. Provider, Historical     Past Medical History:   Diagnosis Date    GERD (gastroesophageal reflux disease)      Past Surgical History:   Procedure Laterality Date    HX COLONOSCOPY  10/31/2018    HX ENDOSCOPY  10/31/2018    HX HYSTERECTOMY        Social History     Tobacco Use    Smoking status: Never Smoker    Smokeless tobacco: Never Used   Substance Use Topics    Alcohol use: Yes     Alcohol/week: 1.2 oz     Types: 2 Glasses of wine per week     Frequency: Never    Drug use: No      Family History   Family history unknown:  Yes     Advance Care Planning 12/17/2018   Patient's Healthcare Decision Maker is: Verbal statement (Legal Next of Kin remains as decision maker)   Confirm Advance Directive None   Patient Would Like to Complete Advance Directive No     Test Results:     Orders Only on 12/21/2018   Component Date Value Ref Range Status    WBC 12/21/2018 4.7  3.4 - 10.8 x10E3/uL Final    RBC 12/21/2018 4.25  3.77 - 5.28 x10E6/uL Final    HGB 12/21/2018 12.6  11.1 - 15.9 g/dL Final    HCT 12/21/2018 37.4  34.0 - 46.6 % Final    MCV 12/21/2018 88  79 - 97 fL Final    MCH 12/21/2018 29.6  26.6 - 33.0 pg Final    MCHC 12/21/2018 33.7  31.5 - 35.7 g/dL Final    RDW 12/21/2018 13.0  12.3 - 15.4 % Final    PLATELET 11/47/4272 435  150 - 379 x10E3/uL Final    NEUTROPHILS 12/21/2018 68  Not Estab. % Final    Lymphocytes 12/21/2018 22  Not Estab. % Final    MONOCYTES 12/21/2018 6  Not Estab. % Final    EOSINOPHILS 12/21/2018 3  Not Estab. % Final    BASOPHILS 12/21/2018 1  Not Estab. % Final    ABS. NEUTROPHILS 12/21/2018 3.2  1.4 - 7.0 x10E3/uL Final    Abs Lymphocytes 12/21/2018 1.0  0.7 - 3.1 x10E3/uL Final    ABS. MONOCYTES 12/21/2018 0.3  0.1 - 0.9 x10E3/uL Final    ABS. EOSINOPHILS 12/21/2018 0.2  0.0 - 0.4 x10E3/uL Final    ABS. BASOPHILS 12/21/2018 0.0  0.0 - 0.2 x10E3/uL Final    IMMATURE GRANULOCYTES 12/21/2018 0  Not Estab. % Final    ABS. IMM. GRANS.  12/21/2018 0.0  0.0 - 0.1 x10E3/uL Final    Hemoglobin A1c 12/21/2018 5.0  4.8 - 5.6 % Final    Comment:          Prediabetes: 5.7 - 6.4           Diabetes: >6.4           Glycemic control for adults with diabetes: <7.0      Estimated average glucose 12/21/2018 97  mg/dL Final    Cholesterol, total 12/21/2018 238* 100 - 199 mg/dL Final    Triglyceride 12/21/2018 274* 0 - 149 mg/dL Final    HDL Cholesterol 12/21/2018 73  >39 mg/dL Final    VLDL, calculated 12/21/2018 55* 5 - 40 mg/dL Final    LDL, calculated 12/21/2018 110* 0 - 99 mg/dL Final    Glucose 12/21/2018 99  65 - 99 mg/dL Final    BUN 12/21/2018 15  6 - 24 mg/dL Final    Creatinine 12/21/2018 0.79  0.57 - 1.00 mg/dL Final    GFR est non-AA 12/21/2018 83  >59 mL/min/1.73 Final    GFR est AA 12/21/2018 96  >59 mL/min/1.73 Final    BUN/Creatinine ratio 12/21/2018 19  9 - 23 Final    Sodium 12/21/2018 139  134 - 144 mmol/L Final    Potassium 12/21/2018 4.5  3.5 - 5.2 mmol/L Final    Chloride 12/21/2018 103  96 - 106 mmol/L Final    CO2 12/21/2018 24  20 - 29 mmol/L Final    Calcium 12/21/2018 9.2  8.7 - 10.2 mg/dL Final    Protein, total 12/21/2018 6.8  6.0 - 8.5 g/dL Final    Albumin 12/21/2018 4.2  3.5 - 5.5 g/dL Final    GLOBULIN, TOTAL 12/21/2018 2.6  1.5 - 4.5 g/dL Final    A-G Ratio 12/21/2018 1.6  1.2 - 2.2 Final    Bilirubin, total 12/21/2018 <0.2  0.0 - 1.2 mg/dL Final    Alk. phosphatase 12/21/2018 62  39 - 117 IU/L Final    AST (SGOT) 12/21/2018 28  0 - 40 IU/L Final    ALT (SGPT) 12/21/2018 16  0 - 32 IU/L Final    TSH 12/21/2018 2.140  0.450 - 4.500 uIU/mL Final    VITAMIN D, 25-HYDROXY 12/21/2018 28.2* 30.0 - 100.0 ng/mL Final    Comment: Vitamin D deficiency has been defined by the [address] [facility] [address] practice guideline as a  level of serum 25-OH vitamin D less than 20 ng/mL (1,2). The Endocrine Society went on to further define vitamin D  insufficiency as a level between 21 and 29 ng/mL (2). 1. IOM ([place] of Medicine). 2010. Dietary reference     intakes for calcium and D. [facility]: The     [place]. 2. Kenisha MF, Benedicto NC, Elian FIORE, et al.     Evaluation, treatment, and prevention of vitamin D     deficiency: an Endocrine Society clinical practice     guideline. JCEM. 2011 Jul; 96(7):1911-30.  Vitamin B12 12/21/2018 451  232 - 1,245 pg/mL Final    Folate 12/21/2018 6.8  >3.0 ng/mL Final    Comment: A serum folate concentration of less than 3.1 ng/mL is  considered to represent clinical deficiency.      Office Visit on 12/17/2018   Component Date Value Ref Range Status    Color (UA POC) 12/17/2018 Colorless   Final    Clarity (UA POC) 12/17/2018 Clear   Final    Glucose (UA POC) 12/17/2018 Negative  Negative Final    Bilirubin (UA POC) 12/17/2018 Negative  Negative Final    Ketones (UA POC) 12/17/2018 Negative  Negative Final    Specific gravity (UA POC) 12/17/2018 1.005  1.001 - 1.035 Final    Blood (UA POC) 12/17/2018 Negative  Negative Final    pH (UA POC) 12/17/2018 6.0  4.6 - 8.0 Final    Protein (UA POC) 12/17/2018 1+  Negative Final    Urobilinogen (UA POC) 12/17/2018 normal  0.2 - 1 Final    Nitrites (UA POC) 12/17/2018 Negative  Negative Final    Leukocyte esterase (UA POC) 12/17/2018 Negative  Negative Final    Specific Gravity 12/17/2018 1.008  1.005 - 1.030 Final    pH (UA) 12/17/2018 6.5  5.0 - 7.5 Final    Color 12/17/2018 Yellow  Yellow Final    Appearance 12/17/2018 Clear  Clear Final    Leukocyte Esterase 12/17/2018 Trace* Negative Final    Protein 12/17/2018 Negative  Negative/Trace Final    Glucose 12/17/2018 Negative  Negative Final    Ketone 12/17/2018 Negative  Negative Final    Blood 12/17/2018 Negative  Negative Final    Bilirubin 12/17/2018 Negative  Negative Final    Urobilinogen 12/17/2018 0.2  0.2 - 1.0 mg/dL Final    Nitrites 12/17/2018 Negative  Negative Final    Microscopic Examination 12/17/2018 See additional order   Final    Microscopic was indicated and was performed.  URINALYSIS REFLEX 12/17/2018 Comment   Final    This specimen has reflexed to a Urine Culture.  WBC 12/17/2018 0-5  0 - 5 /hpf Final    RBC 12/17/2018 None seen  0 - 2 /hpf Final    Epithelial cells 12/17/2018 0-10  0 - 10 /hpf Final    Casts 12/17/2018 None seen  None seen /lpf Final    Mucus 12/17/2018 Present  Not Estab. Final    Bacteria 12/17/2018 Few  None seen/Few Final    Urine Culture, Routine 12/17/2018 *  Final                    Value:Escherichia coli  Greater than 100,000 colony forming units per mL      Comment: Cefazolin <=4 ug/mL  Cefazolin with an YONATHAN <=16 predicts susceptibility to the oral agents  cefaclor, cefdinir, cefpodoxime, cefprozil, cefuroxime, cephalexin,  and loracarbef when used for therapy of uncomplicated urinary tract  infections due to E. coli, Klebsiella pneumoniae, and Proteus  mirabilis. Office Visit on 11/01/2018   Component Date Value Ref Range Status    Glucose POC 11/01/2018 84  mg/dL Final       Objective:    There were no vitals filed for this visit. Wt Readings from Last 3 Encounters:   12/17/18 150 lb (68 kg)     BP Readings from Last 3 Encounters:   12/17/18 112/74   11/01/18 112/84     Review of Systems   Constitutional: Negative for activity change, appetite change, chills, fatigue and fever. HENT: Negative for congestion, dental problem, hearing loss, postnasal drip, sinus pressure, sneezing, sore throat and trouble swallowing. Eyes: Negative for discharge, redness and visual disturbance. Respiratory: Negative for apnea, cough, chest tightness, shortness of breath and wheezing. Cardiovascular: Negative for chest pain, palpitations and leg swelling. Gastrointestinal: Negative for abdominal distention, abdominal pain, blood in stool, constipation, diarrhea, nausea and vomiting. Endocrine: Positive for polyuria. Negative for polydipsia and polyphagia. Genitourinary: Positive for difficulty urinating, dysuria, flank pain, frequency, urgency and vaginal pain. Musculoskeletal: Negative for arthralgias, gait problem, joint swelling and neck pain. Skin: Negative for color change, pallor, rash and wound. Allergic/Immunologic: Negative for environmental allergies and food allergies. Neurological: Negative for dizziness, tremors, weakness, light-headedness, numbness and headaches. Hematological: Negative for adenopathy. Psychiatric/Behavioral: Negative for agitation, confusion and sleep disturbance. The patient is not nervous/anxious. Physical Exam   Constitutional: She is oriented to person, place, and time and well-developed, well-nourished, and in no distress. Vital signs are normal. She appears to not be writhing in pain, not malnourished and not dehydrated. She appears healthy. She does not have a sickly appearance. No distress. HENT:   Head: Normocephalic and atraumatic.    Right Ear: Hearing, tympanic membrane, external ear and ear canal normal.   Left Ear: Hearing, tympanic membrane, external ear and ear canal normal.   Nose: Nose normal.   Mouth/Throat: Uvula is midline, oropharynx is clear and moist and mucous membranes are normal. Mucous membranes are not pale, not dry and not cyanotic. No oral lesions. No uvula swelling. No posterior oropharyngeal edema or posterior oropharyngeal erythema. Eyes: Conjunctivae, EOM and lids are normal. Pupils are equal, round, and reactive to light. Lids are everted and swept, no foreign bodies found. Neck: Trachea normal, normal range of motion and full passive range of motion without pain. Neck supple. No hepatojugular reflux and no JVD present. Carotid bruit is not present. No thyromegaly present. Cardiovascular: Normal rate, regular rhythm, S1 normal, S2 normal, normal heart sounds, intact distal pulses and normal pulses. Occasional extrasystoles are present. Exam reveals no gallop and no friction rub. No murmur heard. No extremity edema is present during today's exam.    Pulmonary/Chest: Effort normal and breath sounds normal.   Abdominal: Soft. Normal appearance and bowel sounds are normal. There is no hepatosplenomegaly. There is no tenderness. There is no CVA tenderness. Neurological: She is alert and oriented to person, place, and time. She has normal sensation, normal strength, normal reflexes and intact cranial nerves. She displays no weakness. She exhibits normal muscle tone. Gait normal. Coordination and gait normal. GCS score is 15. Skin: Skin is warm, dry and intact. No bruising and no rash noted. She is not diaphoretic. No cyanosis. No pallor. Nails show no clubbing. Psychiatric: Mood, memory, affect and judgment normal.   Baseline mood and affect unchanged from normal.    Nursing note and vitals reviewed.        Disclaimer:   Ms. Marybeth Chavez has been advised to call or return to our office if symptoms worsen/change/persist. We as a care team including the patient; discussed expected course/resolution/complications of diagnosis in detail today. Medication risks/benefits/costs/interactions/alternatives discussed Colt Zapien was given an after visit summary which includes diagnoses, current medications, & vitals. Colt Zapien expressed understanding with the diagnosis and plan.

## 2018-12-24 NOTE — PROGRESS NOTES
Communicated with patient in letter. CBC- stable, wnl  CMP- stable, wnl  TSH - stable, wnl   Vit b12 & folate- stable, wnl   Helen D - mildly low, will order her 50,000 vit d to take once per week. Lipid panel - Elevated. I will discuss treatment options with her.

## 2018-12-26 ENCOUNTER — HOSPITAL ENCOUNTER (OUTPATIENT)
Dept: ULTRASOUND IMAGING | Age: 58
Discharge: HOME | End: 2018-12-26
Payer: COMMERCIAL

## 2018-12-26 ENCOUNTER — TELEPHONE (OUTPATIENT)
Dept: GERIATRIC MEDICINE | Age: 58
End: 2018-12-26

## 2018-12-26 DIAGNOSIS — B96.20 E-COLI UTI: ICD-10-CM

## 2018-12-26 DIAGNOSIS — N39.0 E-COLI UTI: ICD-10-CM

## 2018-12-26 DIAGNOSIS — R93.89 ABNORMAL PELVIC ULTRASOUND: Primary | ICD-10-CM

## 2018-12-26 PROCEDURE — 76856 US EXAM PELVIC COMPLETE: CPT

## 2018-12-26 PROCEDURE — 76830 TRANSVAGINAL US NON-OB: CPT

## 2018-12-26 NOTE — PROGRESS NOTES
Discussed with patient in person today. Oval-shaped simple appearing cystic structure adjacent to left side of the urinary bladder with mild mass effect on the urinary bladder. Exact etiology is  uncertain. Please see above text. CT of the pelvis would yield more information. She has a CT of the abdomen / pelvis tomorrow 12/27/18. We will await those results.

## 2018-12-26 NOTE — TELEPHONE ENCOUNTER
Received a message from the Radiologist to add on a pelvic ct for tomorrows study. I will add the test now.

## 2018-12-27 ENCOUNTER — HOSPITAL ENCOUNTER (OUTPATIENT)
Dept: CT IMAGING | Age: 58
Discharge: HOME | End: 2018-12-27
Payer: COMMERCIAL

## 2018-12-27 ENCOUNTER — TELEPHONE (OUTPATIENT)
Dept: GERIATRIC MEDICINE | Age: 58
End: 2018-12-27

## 2018-12-27 DIAGNOSIS — N39.0 E-COLI UTI: ICD-10-CM

## 2018-12-27 DIAGNOSIS — R10.2 PELVIC PAIN IN FEMALE: ICD-10-CM

## 2018-12-27 DIAGNOSIS — M54.50 ACUTE BILATERAL LOW BACK PAIN WITHOUT SCIATICA: ICD-10-CM

## 2018-12-27 DIAGNOSIS — B96.20 E-COLI UTI: ICD-10-CM

## 2018-12-27 DIAGNOSIS — F51.01 PRIMARY INSOMNIA: Primary | ICD-10-CM

## 2018-12-27 PROCEDURE — 74177 CT ABD & PELVIS W/CONTRAST: CPT

## 2018-12-27 RX ORDER — CLONAZEPAM 0.5 MG/1
0.25 TABLET ORAL
Qty: 30 TAB | Refills: 0 | Status: SHIPPED | OUTPATIENT
Start: 2018-12-27 | End: 2019-12-03

## 2018-12-27 RX ORDER — SODIUM CHLORIDE 0.9 % (FLUSH) 0.9 %
10 SYRINGE (ML) INJECTION
Status: COMPLETED | OUTPATIENT
Start: 2018-12-27 | End: 2018-12-27

## 2018-12-27 RX ADMIN — Medication 10 ML: at 09:26

## 2018-12-27 NOTE — LETTER
12/27/2018 1:06 PM 
 
Ms. Marcella Addison [id number] [facility] [address] Dear Marcella Addison This letter is to inform you that you have been scheduled for an appointment at:  
 
[facility] Office with Dr. Jorge Garcia on Monday December 31, 2018 for an arrival time of 8:00 a.m. Please bring with you a photo ID, insurance card, co-pay and a list of all medications you are currently taking. If for any reason you need to cancel or reschedule your appointment, please contact the specialists office and they will assist you.  
 
Sincerely, 
 
 
Fidencio Kawasaki, NP

## 2018-12-27 NOTE — PROGRESS NOTES
GALLBLADDER: There are several poorly calcified gallstones in the gallbladder. KIDNEY: There is a circumaortic left renal vein. Along the left side of the posterior aspect of the urinary bladder is a 3.4 x 2.5 cm low-density cystic structure. This could represent loculated fluid. Conceivably this could represent a cyst in an ovarian remnant. However, there are other areas of oval-shaped apparently loculated fluid present in the pelvis. One is to the left side of the anterior portion of the rectum and this measures 2.4 x 1.2 cm. Multiple smaller locules of fluid are in the right hemipelvis  along the posterior aspect of the rectosigmoid colon and along the right side of the rectum. This whole area is seen on the coronal images measuring 3.9 x 2.6 cm  and appears to be several small loculated areas of fluid. No associated soft tissue densities or masses are noted. These loculated fluid densities are abnormal. This could represent loculated ascites. This could be infectious or inflammatory process. Neoplastic process, early carcinomatosis is a consideration but felt to be somewhat less likely. Exact etiology is uncertain. URINARY BLADDER: There is mild wall thickening of the urinary bladder diffusely suggesting cystitis. I was able to get her an appointment with Dr. Thanh Hoang for Monday Dec. 31. 2018 at 8 am at [facility].

## 2018-12-27 NOTE — TELEPHONE ENCOUNTER
Patient requested a refill of Clonazepam 0.5 mg for insomnia. She states she take 1/2 tablet before bed when she has insomnia.

## 2018-12-27 NOTE — TELEPHONE ENCOUNTER
Results of the abdominal / pelvic CT have returned. There is concern for possible infectious, inflammatory, or early neoplastic consideration. I was able to get Mrs. Francy Mahmood into Dr. Lenora Brown earlier for exam/consult on Monday December 31, 2018 at 8 am at the [facility]. I will fax over records to Dr. Ricardo Sorensen office for review.

## 2018-12-28 ENCOUNTER — TELEPHONE (OUTPATIENT)
Dept: GERIATRIC MEDICINE | Age: 58
End: 2018-12-28

## 2018-12-28 NOTE — LETTER
12/28/2018 4:21 PM 
 
RE:    [id number] [facility] [address] Dr. Salma Song Thank you for agreeing to see Ival Folds. I am referring my patient to you for evaluation of the following concerns:  
 
1. Chronic E-coli positive UTI for the last 9 to 12 months since moving to [place]. 2. Abnormal Pelvic Ultrasound 12/26/18 (See notes/results) 3. Ongoing pelvic pain, Bladder fullness, urinary frequency, urinary pain without resolution after treatment for atrophic vaginosis & antibiotics for positive cultures as well as Urinary bladder wall thickening suggesting cystitis following a month of antibiotic treatment. 4. Chronic use of hormone replacement therapy orally & topically since total hysterectomy at the age of 33. I am concerned this could pose a problem for her health as she ages. 5. Multiple low density cystic structures present in varying sizes and areas seen on CT scan 12/27/2018. Radiologist made mention of many smaller, non specific cysts throughout the abdomen. He also states exact etiology is uncertain but could be infection, inflammatory, or neoplastic processes. Please see her pertinent patient information below. Medical History:    
Past Medical History:  
Diagnosis Date  Anxiety  Chronic UTI (urinary tract infection)  Depression  GERD (gastroesophageal reflux disease)  Hormone replacement therapy  Hyperlipidemia, mixed  Insomnia Allergies:   No Known Allergies Medications:    
Current Outpatient Medications Medication Sig  clonazePAM (KLONOPIN) 0.5 mg tablet Take 0.5 Tabs by mouth nightly as needed. Max Daily Amount: 0.25 mg.  
 albuterol (VENTOLIN HFA) 90 mcg/actuation inhaler 1-2 inhalations every 4-6 hours as needed for wheezing. Dispense spacer as needed.  cephALEXin (KEFLEX) 500 mg capsule Take 1 Cap by mouth two (2) times a day for 10 days.  cran-vitC-mannose-FOS-bromeln 1,285-793 mg/15 mL liqd Use as directed.  nitrofurantoin, macrocrystal-monohydrate, (MACROBID) 100 mg capsule Take 1 Cap by mouth two (2) times a day.  estradiol (VAGIFEM) 10 mcg tab vaginal tablet Insert 10 mcg into vagina two (2) times a week.  conjugated estrogens (PREMARIN) 0.625 mg/gram vaginal cream Apply 0.5 g to affected area daily.  estradiol (ESTRACE) 2 mg tablet Take  by mouth daily.  citalopram (CELEXA) 10 mg tablet Take  by mouth daily.  esomeprazole (NEXIUM) 20 mg capsule Take  by mouth daily. No current facility-administered medications for this visit. Surgical History:    
Past Surgical History:  
Procedure Laterality Date  HX COLONOSCOPY  10/31/2018  HX ENDOSCOPY  10/31/2018  HX HYSTERECTOMY Bilateral   
 at age 33, total hysterectomy  HX SALPINGO-OOPHORECTOMY    
 age 33. Social History:    
Social History Socioeconomic History  Marital status:  Spouse name: Not on file  Number of children: Not on file  Years of education: Not on file  Highest education level: Not on file Tobacco Use  Smoking status: Never Smoker  Smokeless tobacco: Never Used Substance and Sexual Activity  Alcohol use: Yes Alcohol/week: 1.2 oz Types: 2 Glasses of wine per week Frequency: Never  Drug use: No  
 Sexual activity: Yes  
  Partners: Male I appreciate your assistance in Ms. Waggoner's care  and look forward to your findings and recommendations. If you have any questions or need any clarification of care please do not hesitate to call me on my cell ([phone]) or office ([phone]).   
 
Sincerely, 
 
 
Casey Dunn NP

## 2018-12-28 NOTE — TELEPHONE ENCOUNTER
I met with Fili to discuss the findings of her CT scan she had this morning for her continued symptoms of lower pelvic pain, pressure, bloating along with her chronic ongoing UTI's she has been experiencing for the last 9 to 12 months since moving to [place]. Discussed the findings: See below. CT Results (most recent):  Results from Hospital Encounter encounter on 12/27/18   CT ABD PELV W CONT    Narrative EXAM: CT ABD PELV W CONT    INDICATION: RUQ pain, no fever, no elevated WBC; looking for pylo, rectocele,  cystocele, kidney cyst    COMPARISON: Ultrasound exam of 12/26/2014     CONTRAST: 100 mL of Isovue-300. TECHNIQUE:   Following the uneventful intravenous administration of contrast, thin axial  images were obtained through the abdomen and pelvis. Delayed images were  obtained to the urinary bladder Coronal and sagittal reconstructions were  generated. Oral contrast was not administered. CT dose reduction was achieved  through use of a standardized protocol tailored for this examination and  automatic exposure control for dose modulation. FINDINGS:   LUNG BASES: Clear. INCIDENTALLY IMAGED HEART AND MEDIASTINUM: Unremarkable. LIVER: No mass or biliary dilatation. GALLBLADDER: There are several poorly calcified gallstones in the gallbladder. No pericholecystic fluid. No biliary dilatation. .  SPLEEN: No mass. PANCREAS: No mass or ductal dilatation. ADRENALS: Unremarkable. KIDNEYS: No mass, calculus, or hydronephrosis. No evidence for pyelonephritis. There is a circumaortic left renal vein. STOMACH: Unremarkable. SMALL BOWEL: No dilatation or wall thickening. COLON: No dilatation or wall thickening. APPENDIX: Unremarkable. PERITONEUM: No ascites or pneumoperitoneum. RETROPERITONEUM: No lymphadenopathy or aortic aneurysm. REPRODUCTIVE ORGANS: The uterus is not identified. The ovaries are not  identified with certainty. By report the patient has had both ovaries removed.     Along the left side of the posterior aspect of the urinary bladder is a 3.4 x  2.5 cm low-density cystic structure. This could represent loculated fluid. Conceivably this could represent a cyst in an ovarian remnant. However, there  are other areas of oval-shaped apparently loculated fluid present in the pelvis. One is to the left side of the anterior portion of the rectum and this measures  2.4 x 1.2 cm. Multiple smaller locules of fluid are in the right hemipelvis  along the posterior aspect of the rectosigmoid colon and along the right side of  the rectum. This whole area is seen on the coronal images measuring 3.9 x 2.6 cm  and appears to be several small loculated areas of fluid. No associated soft  tissue densities or masses are noted. These loculated fluid densities are  abnormal. This could represent loculated ascites. This could be infectious or  inflammatory process. Neoplastic process, early carcinomatosis is a  consideration but felt to be somewhat less likely. Exact etiology is uncertain. URINARY BLADDER: There is mild wall thickening of the urinary bladder diffusely  suggesting cystitis. No air within the urinary bladder. Telly Charles BONES: No destructive bone lesion. ADDITIONAL COMMENTS: N/A      Impression IMPRESSION:    1. There are poorly calcified gallstones in the gallbladder. No pericholecystic  fluid. 2. Mild urinary bladder wall thickening suggesting cystitis. 3. Oval to rounded cystic structures in the pelvis as described. Largest  projects just to the left of the urinary bladder. There are several other  smaller locules of fluid noted in the pelvis more superiorly and more  posteriorly as described above. These locules of fluid are abnormal and could  represent loculated ascites. Infectious or inflammatory or even neoplastic  process is a consideration. Exact etiology is uncertain. Clinical Correlation  and close follow-up is suggested. Celina Reynolds         I telephoned Dr. Joanna Smallwood's office to get an appointment sooner rather than later as she is worried and I want to make sure we are addressing all of this quickly. I was able to obtain an appointment for Monday Dec. 31, 2018 at 8 am at her [place] office for consult. I am faxing over records for Dr. Shannon Dexter review & preparation for the appointment on Monday. Currently Yamileth was given an opportunity to ask questions, hypothesize on the findings as well as discuss the up coming appointment. She will be taking her  with her for support and is worried but hopeful for answers from the visit. She reported some reduction in her ongoing symptoms related to urinary health and her general overall feeling of wellness. I am glad to hear she is feeling better but she still states she has the fullness in the lower pelvis as well as pain when urinating. I look forward to Dr. Shannon Dexter help with this puzzle as well as helping this patient obtain relief from her ongoing symptoms.

## 2019-01-30 ENCOUNTER — TELEPHONE (OUTPATIENT)
Dept: GERIATRIC MEDICINE | Age: 59
End: 2019-01-30

## 2019-01-30 NOTE — TELEPHONE ENCOUNTER
Duaine Merlin @ Dr. Joelle Patel @ [phone] called and requested CT report on Ms. Tobin Chung to be faxed to [phone]. She will fax the request as well.  Tnx.

## 2019-01-31 NOTE — TELEPHONE ENCOUNTER
Patient came by clinic this am and requested CT scan be faxed to Dr. Crissy Green. Results were faxed

## 2019-02-07 ENCOUNTER — HOSPITAL ENCOUNTER (OUTPATIENT)
Dept: CT IMAGING | Age: 59
Discharge: HOME | End: 2019-02-07

## 2019-02-07 DIAGNOSIS — R10.2 PELVIC AND PERINEAL PAIN: ICD-10-CM

## 2019-02-22 ENCOUNTER — HOSPITAL ENCOUNTER (OUTPATIENT)
Dept: CT IMAGING | Age: 59
Discharge: HOME | End: 2019-02-22
Payer: COMMERCIAL

## 2019-02-22 PROCEDURE — 74177 CT ABD & PELVIS W/CONTRAST: CPT

## 2019-02-22 RX ORDER — SODIUM CHLORIDE 0.9 % (FLUSH) 0.9 %
10 SYRINGE (ML) INJECTION
Status: COMPLETED | OUTPATIENT
Start: 2019-02-22 | End: 2019-02-22

## 2019-02-22 RX ORDER — BARIUM SULFATE 20 MG/ML
900 SUSPENSION ORAL
Status: COMPLETED | OUTPATIENT
Start: 2019-02-22 | End: 2019-02-22

## 2019-02-22 RX ADMIN — Medication 10 ML: at 08:04

## 2019-02-22 RX ADMIN — BARIUM SULFATE 900 ML: 20 SUSPENSION ORAL at 08:04

## 2019-12-03 ENCOUNTER — HOSPITAL ENCOUNTER (INPATIENT)
Age: 59
LOS: 3 days | Discharge: HOME | DRG: 418 | End: 2019-12-06
Attending: EMERGENCY MEDICINE | Admitting: SURGERY
Payer: COMMERCIAL

## 2019-12-03 ENCOUNTER — APPOINTMENT (OUTPATIENT)
Dept: ULTRASOUND IMAGING | Age: 59
DRG: 418 | End: 2019-12-03
Attending: EMERGENCY MEDICINE
Payer: COMMERCIAL

## 2019-12-03 DIAGNOSIS — Z90.49 S/P LAPAROSCOPIC CHOLECYSTECTOMY: ICD-10-CM

## 2019-12-03 DIAGNOSIS — K81.9 CHOLECYSTITIS: Primary | ICD-10-CM

## 2019-12-03 PROBLEM — K85.10 BILIARY ACUTE PANCREATITIS: Status: ACTIVE | Noted: 2019-12-03

## 2019-12-03 PROBLEM — R10.9 ABDOMINAL PAIN: Status: ACTIVE | Noted: 2019-12-03

## 2019-12-03 LAB
ALBUMIN SERPL-MCNC: 4 G/DL (ref 3.5–5)
ALBUMIN/GLOB SERPL: 1 {RATIO} (ref 1.1–2.2)
ALP SERPL-CCNC: 244 U/L (ref 45–117)
ALT SERPL-CCNC: 463 U/L (ref 12–78)
ANION GAP SERPL CALC-SCNC: 8 MMOL/L (ref 5–15)
AST SERPL-CCNC: 411 U/L (ref 15–37)
BASOPHILS # BLD: 0 K/UL (ref 0–0.1)
BASOPHILS NFR BLD: 1 % (ref 0–1)
BILIRUB SERPL-MCNC: 0.9 MG/DL (ref 0.2–1)
BUN SERPL-MCNC: 12 MG/DL (ref 6–20)
BUN/CREAT SERPL: 13 (ref 12–20)
CALCIUM SERPL-MCNC: 9.5 MG/DL (ref 8.5–10.1)
CHLORIDE SERPL-SCNC: 100 MMOL/L (ref 97–108)
CO2 SERPL-SCNC: 31 MMOL/L (ref 21–32)
COMMENT, HOLDF: NORMAL
CREAT SERPL-MCNC: 0.94 MG/DL (ref 0.55–1.02)
DIFFERENTIAL METHOD BLD: ABNORMAL
EOSINOPHIL # BLD: 0.1 K/UL (ref 0–0.4)
EOSINOPHIL NFR BLD: 3 % (ref 0–7)
ERYTHROCYTE [DISTWIDTH] IN BLOOD BY AUTOMATED COUNT: 12.3 % (ref 11.5–14.5)
GLOBULIN SER CALC-MCNC: 4 G/DL (ref 2–4)
GLUCOSE SERPL-MCNC: 108 MG/DL (ref 65–100)
HCT VFR BLD AUTO: 38.7 % (ref 35–47)
HGB BLD-MCNC: 13 G/DL (ref 11.5–16)
IMM GRANULOCYTES # BLD AUTO: 0 K/UL (ref 0–0.04)
IMM GRANULOCYTES NFR BLD AUTO: 0 % (ref 0–0.5)
LACTATE SERPL-SCNC: 0.5 MMOL/L (ref 0.4–2)
LIPASE SERPL-CCNC: 297 U/L (ref 73–393)
LYMPHOCYTES # BLD: 1.2 K/UL (ref 0.8–3.5)
LYMPHOCYTES NFR BLD: 25 % (ref 12–49)
MCH RBC QN AUTO: 29.5 PG (ref 26–34)
MCHC RBC AUTO-ENTMCNC: 33.6 G/DL (ref 30–36.5)
MCV RBC AUTO: 88 FL (ref 80–99)
MONOCYTES # BLD: 0.4 K/UL (ref 0–1)
MONOCYTES NFR BLD: 9 % (ref 5–13)
NEUTS SEG # BLD: 3 K/UL (ref 1.8–8)
NEUTS SEG NFR BLD: 62 % (ref 32–75)
NRBC # BLD: 0 K/UL (ref 0–0.01)
NRBC BLD-RTO: 0 PER 100 WBC
PLATELET # BLD AUTO: 347 K/UL (ref 150–400)
PMV BLD AUTO: 8.8 FL (ref 8.9–12.9)
POTASSIUM SERPL-SCNC: 3.6 MMOL/L (ref 3.5–5.1)
PROT SERPL-MCNC: 8 G/DL (ref 6.4–8.2)
RBC # BLD AUTO: 4.4 M/UL (ref 3.8–5.2)
SAMPLES BEING HELD,HOLD: NORMAL
SODIUM SERPL-SCNC: 139 MMOL/L (ref 136–145)
TROPONIN I SERPL-MCNC: <0.05 NG/ML
WBC # BLD AUTO: 4.8 K/UL (ref 3.6–11)

## 2019-12-03 PROCEDURE — 83605 ASSAY OF LACTIC ACID: CPT

## 2019-12-03 PROCEDURE — 83690 ASSAY OF LIPASE: CPT

## 2019-12-03 PROCEDURE — 74011250636 HC RX REV CODE- 250/636: Performed by: EMERGENCY MEDICINE

## 2019-12-03 PROCEDURE — 74011000258 HC RX REV CODE- 258: Performed by: EMERGENCY MEDICINE

## 2019-12-03 PROCEDURE — 36415 COLL VENOUS BLD VENIPUNCTURE: CPT

## 2019-12-03 PROCEDURE — 84484 ASSAY OF TROPONIN QUANT: CPT

## 2019-12-03 PROCEDURE — 80053 COMPREHEN METABOLIC PANEL: CPT

## 2019-12-03 PROCEDURE — 96375 TX/PRO/DX INJ NEW DRUG ADDON: CPT

## 2019-12-03 PROCEDURE — 93005 ELECTROCARDIOGRAM TRACING: CPT

## 2019-12-03 PROCEDURE — 85025 COMPLETE CBC W/AUTO DIFF WBC: CPT

## 2019-12-03 PROCEDURE — 99218 HC RM OBSERVATION: CPT

## 2019-12-03 PROCEDURE — 65410000002 HC RM PRIVATE OB

## 2019-12-03 PROCEDURE — 76705 ECHO EXAM OF ABDOMEN: CPT

## 2019-12-03 PROCEDURE — 96374 THER/PROPH/DIAG INJ IV PUSH: CPT

## 2019-12-03 PROCEDURE — 99285 EMERGENCY DEPT VISIT HI MDM: CPT

## 2019-12-03 PROCEDURE — 74011250636 HC RX REV CODE- 250/636: Performed by: SURGERY

## 2019-12-03 RX ORDER — MORPHINE SULFATE 2 MG/ML
2 INJECTION, SOLUTION INTRAMUSCULAR; INTRAVENOUS ONCE
Status: COMPLETED | OUTPATIENT
Start: 2019-12-03 | End: 2019-12-03

## 2019-12-03 RX ORDER — ONDANSETRON 2 MG/ML
4 INJECTION INTRAMUSCULAR; INTRAVENOUS
Status: DISCONTINUED | OUTPATIENT
Start: 2019-12-03 | End: 2019-12-06 | Stop reason: HOSPADM

## 2019-12-03 RX ORDER — SODIUM CHLORIDE 0.9 % (FLUSH) 0.9 %
SYRINGE (ML) INJECTION
Status: COMPLETED
Start: 2019-12-03 | End: 2019-12-03

## 2019-12-03 RX ORDER — HYDROMORPHONE HYDROCHLORIDE 1 MG/ML
1 INJECTION, SOLUTION INTRAMUSCULAR; INTRAVENOUS; SUBCUTANEOUS
Status: DISCONTINUED | OUTPATIENT
Start: 2019-12-03 | End: 2019-12-06 | Stop reason: HOSPADM

## 2019-12-03 RX ORDER — ONDANSETRON 2 MG/ML
4 INJECTION INTRAMUSCULAR; INTRAVENOUS
Status: COMPLETED | OUTPATIENT
Start: 2019-12-03 | End: 2019-12-03

## 2019-12-03 RX ORDER — SODIUM CHLORIDE, SODIUM LACTATE, POTASSIUM CHLORIDE, CALCIUM CHLORIDE 600; 310; 30; 20 MG/100ML; MG/100ML; MG/100ML; MG/100ML
125 INJECTION, SOLUTION INTRAVENOUS CONTINUOUS
Status: DISCONTINUED | OUTPATIENT
Start: 2019-12-03 | End: 2019-12-06 | Stop reason: HOSPADM

## 2019-12-03 RX ADMIN — Medication 10 ML: at 19:12

## 2019-12-03 RX ADMIN — PIPERACILLIN AND TAZOBACTAM 3.38 G: 3; .375 INJECTION, POWDER, FOR SOLUTION INTRAVENOUS at 16:47

## 2019-12-03 RX ADMIN — SODIUM CHLORIDE 1000 ML: 900 INJECTION, SOLUTION INTRAVENOUS at 16:47

## 2019-12-03 RX ADMIN — SODIUM CHLORIDE, SODIUM LACTATE, POTASSIUM CHLORIDE, AND CALCIUM CHLORIDE 125 ML/HR: 600; 310; 30; 20 INJECTION, SOLUTION INTRAVENOUS at 19:12

## 2019-12-03 RX ADMIN — MORPHINE SULFATE 2 MG: 2 INJECTION, SOLUTION INTRAMUSCULAR; INTRAVENOUS at 14:51

## 2019-12-03 RX ADMIN — ONDANSETRON 4 MG: 2 INJECTION INTRAMUSCULAR; INTRAVENOUS at 14:51

## 2019-12-03 NOTE — ED PROVIDER NOTES
HPI   The patient is a 60-year-old white female with known gallstones who presents the emergency room with acute onset of epigastric and right upper quadrant abdominal pain which started this morning and is been ongoing for about 2 hours. She had a similar episode Sunday which lasted severely for about an hour. She had a CT scan in the not too distant past which revealed known gallstones but she was having no attacks. She has had nausea vomiting and rates the pain is severely crampy about 10 out of 10. Past Medical History:   Diagnosis Date    Anxiety     Chronic UTI (urinary tract infection)     GERD (gastroesophageal reflux disease)     Hormone replacement therapy     Hyperlipidemia, mixed        Past Surgical History:   Procedure Laterality Date    HX COLONOSCOPY  10/31/2018    HX ENDOSCOPY  10/31/2018    HX HYSTERECTOMY Bilateral     at age 33, total hysterectomy     HX SALPINGO-OOPHORECTOMY      age 33. Family History:   Family history unknown: Yes       Social History     Socioeconomic History    Marital status:      Spouse name: Not on file    Number of children: Not on file    Years of education: Not on file    Highest education level: Not on file   Occupational History    Not on file   Social Needs    Financial resource strain: Not on file    Food insecurity:     Worry: Not on file     Inability: Not on file    Transportation needs:     Medical: Not on file     Non-medical: Not on file   Tobacco Use    Smoking status: Never Smoker    Smokeless tobacco: Never Used   Substance and Sexual Activity    Alcohol use:  Yes     Alcohol/week: 2.0 standard drinks     Types: 2 Glasses of wine per week     Frequency: Never    Drug use: No    Sexual activity: Yes     Partners: Male   Lifestyle    Physical activity:     Days per week: Not on file     Minutes per session: Not on file    Stress: Not on file   Relationships    Social connections:     Talks on phone: Not on file Gets together: Not on file     Attends Protestant service: Not on file     Active member of club or organization: Not on file     Attends meetings of clubs or organizations: Not on file     Relationship status: Not on file    Intimate partner violence:     Fear of current or ex partner: Not on file     Emotionally abused: Not on file     Physically abused: Not on file     Forced sexual activity: Not on file   Other Topics Concern    Not on file   Social History Narrative    Not on file         ALLERGIES: Patient has no known allergies. Review of Systems   All other systems reviewed and are negative. Vitals:    12/03/19 1411   BP: 129/89   Pulse: (!) 106   Resp: 18   Temp: 98 °F (36.7 °C)   SpO2: 98%   Weight: 70 kg (154 lb 5.2 oz)   Height: 5' 5\" (1.651 m)            Physical Exam  Vitals signs and nursing note reviewed. Constitutional:       Appearance: She is well-developed. HENT:      Head: Normocephalic and atraumatic. Mouth/Throat:      Pharynx: No oropharyngeal exudate. Eyes:      General: No scleral icterus. Conjunctiva/sclera: Conjunctivae normal.   Neck:      Musculoskeletal: Neck supple. Thyroid: No thyromegaly. Cardiovascular:      Rate and Rhythm: Normal rate and regular rhythm. Heart sounds: Normal heart sounds. No murmur. No friction rub. No gallop. Pulmonary:      Effort: Pulmonary effort is normal. No respiratory distress. Breath sounds: Normal breath sounds. No stridor. No wheezing or rales. Abdominal:      General: Bowel sounds are normal.      Tenderness: There is no tenderness. There is no guarding or rebound. Comments: Epigastric and right upper quadrant pain with guarding and tenderness   Musculoskeletal: Normal range of motion. Lymphadenopathy:      Cervical: No cervical adenopathy. Skin:     General: Skin is warm and dry. Neurological:      Mental Status: She is alert and oriented to person, place, and time.           MDM  Number of Diagnoses or Management Options  Cholecystitis:      Amount and/or Complexity of Data Reviewed  Clinical lab tests: ordered and reviewed  Tests in the radiology section of CPT®: ordered and reviewed  Tests in the medicine section of CPT®: ordered and reviewed  Discussion of test results with the performing providers: yes  Decide to obtain previous medical records or to obtain history from someone other than the patient: yes  Obtain history from someone other than the patient: yes  Review and summarize past medical records: yes  Discuss the patient with other providers: yes  Independent visualization of images, tracings, or specimens: yes           Procedures        Assessment and plan I believe the patient has acute cholecystitis   with a common bile duct stone causing elevated liver function test have spoken to Dr. Mellisa Acevedo who is agreed to admit the patient for probable ERCP and ultimately cholecystectomy. Total critical care time spent exclusive of procedures: 34  minutes    ED EKG interpretation:  Rhythm:nsr  Rate 100 ns st changes  This EKG was interpreted by Niurka Santoro MD,ED Provider.

## 2019-12-03 NOTE — ED TRIAGE NOTES
Had similar episode on Sunday, went to ER but got better before checking in, so went home. Had Ct done this year and dx'd with gallstones, but hasn't had any issues with it. Hx of gastritis and reflux, on Nexium and Pepcid. Denies n/v, +sob, hard to take deep breath d/t pain, states pain worsens with deep breath.

## 2019-12-03 NOTE — PROGRESS NOTES
TRANSFER - IN REPORT:    Verbal report received from [facility] Ashley(name) on Armand Cade  being received from ED [place](unit) for routine progression of care      Report consisted of patients Situation, Background, Assessment and   Recommendations(SBAR). Information from the following report(s) SBAR, Kardex, ED Summary, Intake/Output, MAR and Recent Results was reviewed with the receiving nurse. Opportunity for questions and clarification was provided. Assessment completed upon patients arrival to unit and care assumed.

## 2019-12-03 NOTE — ED NOTES
TRANSFER - OUT REPORT:    Verbal report given to Elyssa(name) on Hood Hank  being transferred to 3(unit) for routine progression of care       Report consisted of patients Situation, Background, Assessment and   Recommendations(SBAR). Information from the following report(s) SBAR and ED Summary was reviewed with the receiving nurse. Lines:   Peripheral IV 12/03/19 Left Antecubital (Active)   Site Assessment Clean, dry, & intact 12/3/2019  2:17 PM   Phlebitis Assessment 0 12/3/2019  2:17 PM   Infiltration Assessment 0 12/3/2019  2:17 PM   Dressing Status Clean, dry, & intact; Occlusive 12/3/2019  2:17 PM   Dressing Type Transparent 12/3/2019  2:17 PM   Hub Color/Line Status Pink;Flushed 12/3/2019  2:17 PM   Action Taken Blood drawn 12/3/2019  2:17 PM        Opportunity for questions and clarification was provided.

## 2019-12-04 ENCOUNTER — APPOINTMENT (OUTPATIENT)
Dept: MRI IMAGING | Age: 59
DRG: 418 | End: 2019-12-04
Attending: SURGERY
Payer: COMMERCIAL

## 2019-12-04 ENCOUNTER — APPOINTMENT (OUTPATIENT)
Dept: GENERAL RADIOLOGY | Age: 59
DRG: 418 | End: 2019-12-04
Attending: SURGERY
Payer: COMMERCIAL

## 2019-12-04 ENCOUNTER — ANESTHESIA (OUTPATIENT)
Dept: SURGERY | Age: 59
DRG: 418 | End: 2019-12-04
Payer: COMMERCIAL

## 2019-12-04 ENCOUNTER — ANESTHESIA EVENT (OUTPATIENT)
Dept: SURGERY | Age: 59
DRG: 418 | End: 2019-12-04
Payer: COMMERCIAL

## 2019-12-04 LAB
ALBUMIN SERPL-MCNC: 3 G/DL (ref 3.5–5)
ALBUMIN/GLOB SERPL: 0.9 {RATIO} (ref 1.1–2.2)
ALP SERPL-CCNC: 226 U/L (ref 45–117)
ALT SERPL-CCNC: 398 U/L (ref 12–78)
ANION GAP SERPL CALC-SCNC: 6 MMOL/L (ref 5–15)
AST SERPL-CCNC: 353 U/L (ref 15–37)
ATRIAL RATE: 100 BPM
BILIRUB SERPL-MCNC: 0.8 MG/DL (ref 0.2–1)
BUN SERPL-MCNC: 9 MG/DL (ref 6–20)
BUN/CREAT SERPL: 11 (ref 12–20)
CALCIUM SERPL-MCNC: 8.4 MG/DL (ref 8.5–10.1)
CALCULATED P AXIS, ECG09: 60 DEGREES
CALCULATED R AXIS, ECG10: 60 DEGREES
CALCULATED T AXIS, ECG11: 28 DEGREES
CHLORIDE SERPL-SCNC: 107 MMOL/L (ref 97–108)
CO2 SERPL-SCNC: 27 MMOL/L (ref 21–32)
CREAT SERPL-MCNC: 0.8 MG/DL (ref 0.55–1.02)
DIAGNOSIS, 93000: NORMAL
ERYTHROCYTE [DISTWIDTH] IN BLOOD BY AUTOMATED COUNT: 12.5 % (ref 11.5–14.5)
GLOBULIN SER CALC-MCNC: 3.3 G/DL (ref 2–4)
GLUCOSE SERPL-MCNC: 90 MG/DL (ref 65–100)
HCT VFR BLD AUTO: 35 % (ref 35–47)
HGB BLD-MCNC: 11.6 G/DL (ref 11.5–16)
LIPASE SERPL-CCNC: 165 U/L (ref 73–393)
MCH RBC QN AUTO: 30.1 PG (ref 26–34)
MCHC RBC AUTO-ENTMCNC: 33.1 G/DL (ref 30–36.5)
MCV RBC AUTO: 90.9 FL (ref 80–99)
NRBC # BLD: 0 K/UL (ref 0–0.01)
NRBC BLD-RTO: 0 PER 100 WBC
P-R INTERVAL, ECG05: 130 MS
PLATELET # BLD AUTO: 273 K/UL (ref 150–400)
PMV BLD AUTO: 8.9 FL (ref 8.9–12.9)
POTASSIUM SERPL-SCNC: 3.7 MMOL/L (ref 3.5–5.1)
PROT SERPL-MCNC: 6.3 G/DL (ref 6.4–8.2)
Q-T INTERVAL, ECG07: 342 MS
QRS DURATION, ECG06: 70 MS
QTC CALCULATION (BEZET), ECG08: 441 MS
RBC # BLD AUTO: 3.85 M/UL (ref 3.8–5.2)
SODIUM SERPL-SCNC: 140 MMOL/L (ref 136–145)
VENTRICULAR RATE, ECG03: 100 BPM
WBC # BLD AUTO: 4.6 K/UL (ref 3.6–11)

## 2019-12-04 PROCEDURE — 77030010513 HC APPL CLP LIG J&J -C: Performed by: SURGERY

## 2019-12-04 PROCEDURE — 77030038093 HC CATH CHOLGM OP PMI PRGV-C: Performed by: SURGERY

## 2019-12-04 PROCEDURE — 74011250636 HC RX REV CODE- 250/636: Performed by: NURSE ANESTHETIST, CERTIFIED REGISTERED

## 2019-12-04 PROCEDURE — 76210000017 HC OR PH I REC 1.5 TO 2 HR: Performed by: SURGERY

## 2019-12-04 PROCEDURE — 77030020263 HC SOL INJ SOD CL0.9% LFCR 1000ML: Performed by: SURGERY

## 2019-12-04 PROCEDURE — 77030007955 HC PCH ENDOSC SPEC J&J -B: Performed by: SURGERY

## 2019-12-04 PROCEDURE — 77030002933 HC SUT MCRYL J&J -A: Performed by: SURGERY

## 2019-12-04 PROCEDURE — 65270000032 HC RM SEMIPRIVATE

## 2019-12-04 PROCEDURE — 77030020747 HC TU INSUF ENDOSC TELE -A: Performed by: SURGERY

## 2019-12-04 PROCEDURE — 74011000258 HC RX REV CODE- 258: Performed by: SURGERY

## 2019-12-04 PROCEDURE — 77030008684 HC TU ET CUF COVD -B: Performed by: ANESTHESIOLOGY

## 2019-12-04 PROCEDURE — 85027 COMPLETE CBC AUTOMATED: CPT

## 2019-12-04 PROCEDURE — A9585 GADOBUTROL INJECTION: HCPCS | Performed by: SURGERY

## 2019-12-04 PROCEDURE — 77030008608 HC TRCR ENDOSC SMTH AMR -B: Performed by: SURGERY

## 2019-12-04 PROCEDURE — 74011000250 HC RX REV CODE- 250: Performed by: NURSE ANESTHETIST, CERTIFIED REGISTERED

## 2019-12-04 PROCEDURE — 36415 COLL VENOUS BLD VENIPUNCTURE: CPT

## 2019-12-04 PROCEDURE — 74011636320 HC RX REV CODE- 636/320: Performed by: SURGERY

## 2019-12-04 PROCEDURE — 74011250636 HC RX REV CODE- 250/636: Performed by: ANESTHESIOLOGY

## 2019-12-04 PROCEDURE — 74300 X-RAY BILE DUCTS/PANCREAS: CPT

## 2019-12-04 PROCEDURE — 77030031139 HC SUT VCRL2 J&J -A: Performed by: SURGERY

## 2019-12-04 PROCEDURE — 77030020829: Performed by: SURGERY

## 2019-12-04 PROCEDURE — 77030040922 HC BLNKT HYPOTHRM STRY -A

## 2019-12-04 PROCEDURE — 74011250636 HC RX REV CODE- 250/636: Performed by: SURGERY

## 2019-12-04 PROCEDURE — 77030017006 HC DISECTR CRV1 J&J -B: Performed by: SURGERY

## 2019-12-04 PROCEDURE — 76060000034 HC ANESTHESIA 1.5 TO 2 HR: Performed by: SURGERY

## 2019-12-04 PROCEDURE — 77030012770 HC TRCR OPT FX AMR -B: Performed by: SURGERY

## 2019-12-04 PROCEDURE — 74011250637 HC RX REV CODE- 250/637: Performed by: SURGERY

## 2019-12-04 PROCEDURE — 83690 ASSAY OF LIPASE: CPT

## 2019-12-04 PROCEDURE — 77030021566 MRI ABD W MRCP W WO CONT

## 2019-12-04 PROCEDURE — 88304 TISSUE EXAM BY PATHOLOGIST: CPT

## 2019-12-04 PROCEDURE — 74011000250 HC RX REV CODE- 250: Performed by: SURGERY

## 2019-12-04 PROCEDURE — BF121ZZ FLUOROSCOPY OF GALLBLADDER USING LOW OSMOLAR CONTRAST: ICD-10-PCS | Performed by: SURGERY

## 2019-12-04 PROCEDURE — 77030008756 HC TU IRR SUC STRY -B: Performed by: SURGERY

## 2019-12-04 PROCEDURE — 76010000153 HC OR TIME 1.5 TO 2 HR: Performed by: SURGERY

## 2019-12-04 PROCEDURE — 77030011640 HC PAD GRND REM COVD -A: Performed by: SURGERY

## 2019-12-04 PROCEDURE — 77030040361 HC SLV COMPR DVT MDII -B: Performed by: SURGERY

## 2019-12-04 PROCEDURE — 77030018836 HC SOL IRR NACL ICUM -A: Performed by: SURGERY

## 2019-12-04 PROCEDURE — 0FT44ZZ RESECTION OF GALLBLADDER, PERCUTANEOUS ENDOSCOPIC APPROACH: ICD-10-PCS | Performed by: SURGERY

## 2019-12-04 PROCEDURE — 74011250636 HC RX REV CODE- 250/636

## 2019-12-04 PROCEDURE — 74011250637 HC RX REV CODE- 250/637: Performed by: NURSE PRACTITIONER

## 2019-12-04 PROCEDURE — 77030039266 HC ADH SKN EXOFIN S2SG -A: Performed by: SURGERY

## 2019-12-04 PROCEDURE — 77030009403 HC ELECTRD ENDO MEGA -B: Performed by: SURGERY

## 2019-12-04 PROCEDURE — 80053 COMPREHEN METABOLIC PANEL: CPT

## 2019-12-04 RX ORDER — LIDOCAINE HYDROCHLORIDE 10 MG/ML
0.1 INJECTION, SOLUTION EPIDURAL; INFILTRATION; INTRACAUDAL; PERINEURAL AS NEEDED
Status: DISCONTINUED | OUTPATIENT
Start: 2019-12-04 | End: 2019-12-04 | Stop reason: HOSPADM

## 2019-12-04 RX ORDER — FENTANYL CITRATE 50 UG/ML
INJECTION, SOLUTION INTRAMUSCULAR; INTRAVENOUS
Status: COMPLETED
Start: 2019-12-04 | End: 2019-12-04

## 2019-12-04 RX ORDER — FENTANYL CITRATE 50 UG/ML
25 INJECTION, SOLUTION INTRAMUSCULAR; INTRAVENOUS
Status: COMPLETED | OUTPATIENT
Start: 2019-12-04 | End: 2019-12-04

## 2019-12-04 RX ORDER — ACETAMINOPHEN 325 MG/1
650 TABLET ORAL
Status: DISCONTINUED | OUTPATIENT
Start: 2019-12-04 | End: 2019-12-06 | Stop reason: HOSPADM

## 2019-12-04 RX ORDER — GLYCOPYRROLATE 0.2 MG/ML
INJECTION INTRAMUSCULAR; INTRAVENOUS AS NEEDED
Status: DISCONTINUED | OUTPATIENT
Start: 2019-12-04 | End: 2019-12-04 | Stop reason: HOSPADM

## 2019-12-04 RX ORDER — SODIUM CHLORIDE, SODIUM LACTATE, POTASSIUM CHLORIDE, CALCIUM CHLORIDE 600; 310; 30; 20 MG/100ML; MG/100ML; MG/100ML; MG/100ML
25 INJECTION, SOLUTION INTRAVENOUS CONTINUOUS
Status: DISCONTINUED | OUTPATIENT
Start: 2019-12-04 | End: 2019-12-04 | Stop reason: HOSPADM

## 2019-12-04 RX ORDER — MIDAZOLAM HYDROCHLORIDE 1 MG/ML
1 INJECTION, SOLUTION INTRAMUSCULAR; INTRAVENOUS AS NEEDED
Status: DISCONTINUED | OUTPATIENT
Start: 2019-12-04 | End: 2019-12-04 | Stop reason: HOSPADM

## 2019-12-04 RX ORDER — ROPIVACAINE HYDROCHLORIDE 5 MG/ML
30 INJECTION, SOLUTION EPIDURAL; INFILTRATION; PERINEURAL AS NEEDED
Status: DISCONTINUED | OUTPATIENT
Start: 2019-12-04 | End: 2019-12-04 | Stop reason: HOSPADM

## 2019-12-04 RX ORDER — SODIUM CHLORIDE 0.9 % (FLUSH) 0.9 %
5-40 SYRINGE (ML) INJECTION EVERY 8 HOURS
Status: DISCONTINUED | OUTPATIENT
Start: 2019-12-04 | End: 2019-12-04 | Stop reason: HOSPADM

## 2019-12-04 RX ORDER — SODIUM CHLORIDE 0.9 % (FLUSH) 0.9 %
5-40 SYRINGE (ML) INJECTION EVERY 8 HOURS
Status: DISCONTINUED | OUTPATIENT
Start: 2019-12-04 | End: 2019-12-06 | Stop reason: HOSPADM

## 2019-12-04 RX ORDER — FENTANYL CITRATE 50 UG/ML
INJECTION, SOLUTION INTRAMUSCULAR; INTRAVENOUS AS NEEDED
Status: DISCONTINUED | OUTPATIENT
Start: 2019-12-04 | End: 2019-12-04 | Stop reason: HOSPADM

## 2019-12-04 RX ORDER — SODIUM CHLORIDE, SODIUM LACTATE, POTASSIUM CHLORIDE, CALCIUM CHLORIDE 600; 310; 30; 20 MG/100ML; MG/100ML; MG/100ML; MG/100ML
100 INJECTION, SOLUTION INTRAVENOUS CONTINUOUS
Status: DISCONTINUED | OUTPATIENT
Start: 2019-12-04 | End: 2019-12-04 | Stop reason: HOSPADM

## 2019-12-04 RX ORDER — SODIUM CHLORIDE, SODIUM LACTATE, POTASSIUM CHLORIDE, CALCIUM CHLORIDE 600; 310; 30; 20 MG/100ML; MG/100ML; MG/100ML; MG/100ML
INJECTION, SOLUTION INTRAVENOUS
Status: DISCONTINUED | OUTPATIENT
Start: 2019-12-04 | End: 2019-12-04 | Stop reason: HOSPADM

## 2019-12-04 RX ORDER — FENTANYL CITRATE 50 UG/ML
50 INJECTION, SOLUTION INTRAMUSCULAR; INTRAVENOUS AS NEEDED
Status: DISCONTINUED | OUTPATIENT
Start: 2019-12-04 | End: 2019-12-04 | Stop reason: HOSPADM

## 2019-12-04 RX ORDER — PHENYLEPHRINE HCL IN 0.9% NACL 0.4MG/10ML
SYRINGE (ML) INTRAVENOUS AS NEEDED
Status: DISCONTINUED | OUTPATIENT
Start: 2019-12-04 | End: 2019-12-04 | Stop reason: HOSPADM

## 2019-12-04 RX ORDER — ESTRADIOL 1 MG/1
2 TABLET ORAL DAILY
Status: DISCONTINUED | OUTPATIENT
Start: 2019-12-04 | End: 2019-12-06 | Stop reason: HOSPADM

## 2019-12-04 RX ORDER — MORPHINE SULFATE 10 MG/ML
2 INJECTION, SOLUTION INTRAMUSCULAR; INTRAVENOUS
Status: DISCONTINUED | OUTPATIENT
Start: 2019-12-04 | End: 2019-12-04 | Stop reason: HOSPADM

## 2019-12-04 RX ORDER — SODIUM CHLORIDE 9 MG/ML
25 INJECTION, SOLUTION INTRAVENOUS CONTINUOUS
Status: DISCONTINUED | OUTPATIENT
Start: 2019-12-04 | End: 2019-12-04 | Stop reason: HOSPADM

## 2019-12-04 RX ORDER — LORAZEPAM 1 MG/1
1 TABLET ORAL
Status: DISCONTINUED | OUTPATIENT
Start: 2019-12-04 | End: 2019-12-06 | Stop reason: HOSPADM

## 2019-12-04 RX ORDER — DIPHENHYDRAMINE HYDROCHLORIDE 50 MG/ML
12.5 INJECTION, SOLUTION INTRAMUSCULAR; INTRAVENOUS AS NEEDED
Status: DISCONTINUED | OUTPATIENT
Start: 2019-12-04 | End: 2019-12-04 | Stop reason: HOSPADM

## 2019-12-04 RX ORDER — OXYCODONE AND ACETAMINOPHEN 5; 325 MG/1; MG/1
1 TABLET ORAL
Status: DISCONTINUED | OUTPATIENT
Start: 2019-12-04 | End: 2019-12-05

## 2019-12-04 RX ORDER — BUPIVACAINE HYDROCHLORIDE 5 MG/ML
50 INJECTION, SOLUTION EPIDURAL; INTRACAUDAL ONCE
Status: COMPLETED | OUTPATIENT
Start: 2019-12-04 | End: 2019-12-04

## 2019-12-04 RX ORDER — SODIUM CHLORIDE 0.9 % (FLUSH) 0.9 %
5-40 SYRINGE (ML) INJECTION AS NEEDED
Status: DISCONTINUED | OUTPATIENT
Start: 2019-12-04 | End: 2019-12-06 | Stop reason: HOSPADM

## 2019-12-04 RX ORDER — PANTOPRAZOLE SODIUM 40 MG/1
40 TABLET, DELAYED RELEASE ORAL
Status: DISCONTINUED | OUTPATIENT
Start: 2019-12-04 | End: 2019-12-06 | Stop reason: HOSPADM

## 2019-12-04 RX ORDER — ROCURONIUM BROMIDE 10 MG/ML
INJECTION, SOLUTION INTRAVENOUS AS NEEDED
Status: DISCONTINUED | OUTPATIENT
Start: 2019-12-04 | End: 2019-12-04 | Stop reason: HOSPADM

## 2019-12-04 RX ORDER — SUCCINYLCHOLINE CHLORIDE 20 MG/ML
INJECTION INTRAMUSCULAR; INTRAVENOUS AS NEEDED
Status: DISCONTINUED | OUTPATIENT
Start: 2019-12-04 | End: 2019-12-04 | Stop reason: HOSPADM

## 2019-12-04 RX ORDER — SODIUM CHLORIDE 0.9 % (FLUSH) 0.9 %
5-40 SYRINGE (ML) INJECTION AS NEEDED
Status: DISCONTINUED | OUTPATIENT
Start: 2019-12-04 | End: 2019-12-04 | Stop reason: HOSPADM

## 2019-12-04 RX ORDER — CITALOPRAM 20 MG/1
10 TABLET, FILM COATED ORAL DAILY
Status: DISCONTINUED | OUTPATIENT
Start: 2019-12-04 | End: 2019-12-06 | Stop reason: HOSPADM

## 2019-12-04 RX ORDER — MIDAZOLAM HYDROCHLORIDE 1 MG/ML
INJECTION, SOLUTION INTRAMUSCULAR; INTRAVENOUS AS NEEDED
Status: DISCONTINUED | OUTPATIENT
Start: 2019-12-04 | End: 2019-12-04 | Stop reason: HOSPADM

## 2019-12-04 RX ORDER — ONDANSETRON 2 MG/ML
INJECTION INTRAMUSCULAR; INTRAVENOUS AS NEEDED
Status: DISCONTINUED | OUTPATIENT
Start: 2019-12-04 | End: 2019-12-04 | Stop reason: HOSPADM

## 2019-12-04 RX ORDER — ACETAMINOPHEN 325 MG/1
650 TABLET ORAL ONCE
Status: DISCONTINUED | OUTPATIENT
Start: 2019-12-04 | End: 2019-12-04 | Stop reason: HOSPADM

## 2019-12-04 RX ORDER — LIDOCAINE HYDROCHLORIDE 20 MG/ML
INJECTION, SOLUTION EPIDURAL; INFILTRATION; INTRACAUDAL; PERINEURAL AS NEEDED
Status: DISCONTINUED | OUTPATIENT
Start: 2019-12-04 | End: 2019-12-04 | Stop reason: HOSPADM

## 2019-12-04 RX ORDER — HYDROMORPHONE HYDROCHLORIDE 1 MG/ML
0.5 INJECTION, SOLUTION INTRAMUSCULAR; INTRAVENOUS; SUBCUTANEOUS
Status: COMPLETED | OUTPATIENT
Start: 2019-12-04 | End: 2019-12-04

## 2019-12-04 RX ORDER — DEXAMETHASONE SODIUM PHOSPHATE 4 MG/ML
INJECTION, SOLUTION INTRA-ARTICULAR; INTRALESIONAL; INTRAMUSCULAR; INTRAVENOUS; SOFT TISSUE AS NEEDED
Status: DISCONTINUED | OUTPATIENT
Start: 2019-12-04 | End: 2019-12-04 | Stop reason: HOSPADM

## 2019-12-04 RX ORDER — NEOSTIGMINE METHYLSULFATE 1 MG/ML
INJECTION INTRAVENOUS AS NEEDED
Status: DISCONTINUED | OUTPATIENT
Start: 2019-12-04 | End: 2019-12-04 | Stop reason: HOSPADM

## 2019-12-04 RX ORDER — ONDANSETRON 2 MG/ML
4 INJECTION INTRAMUSCULAR; INTRAVENOUS AS NEEDED
Status: DISCONTINUED | OUTPATIENT
Start: 2019-12-04 | End: 2019-12-04 | Stop reason: HOSPADM

## 2019-12-04 RX ORDER — PROPOFOL 10 MG/ML
INJECTION, EMULSION INTRAVENOUS AS NEEDED
Status: DISCONTINUED | OUTPATIENT
Start: 2019-12-04 | End: 2019-12-04 | Stop reason: HOSPADM

## 2019-12-04 RX ORDER — MIDAZOLAM HYDROCHLORIDE 1 MG/ML
0.5 INJECTION, SOLUTION INTRAMUSCULAR; INTRAVENOUS
Status: DISCONTINUED | OUTPATIENT
Start: 2019-12-04 | End: 2019-12-04 | Stop reason: HOSPADM

## 2019-12-04 RX ADMIN — GLYCOPYRROLATE 0.1 MG: 0.2 INJECTION, SOLUTION INTRAMUSCULAR; INTRAVENOUS at 14:33

## 2019-12-04 RX ADMIN — HYDROMORPHONE HYDROCHLORIDE 0.5 MG: 1 INJECTION, SOLUTION INTRAMUSCULAR; INTRAVENOUS; SUBCUTANEOUS at 17:22

## 2019-12-04 RX ADMIN — ROCURONIUM BROMIDE 20 MG: 10 SOLUTION INTRAVENOUS at 14:50

## 2019-12-04 RX ADMIN — FENTANYL CITRATE 25 MCG: 50 INJECTION, SOLUTION INTRAMUSCULAR; INTRAVENOUS at 16:50

## 2019-12-04 RX ADMIN — ACETAMINOPHEN 650 MG: 325 TABLET ORAL at 13:21

## 2019-12-04 RX ADMIN — CITALOPRAM HYDROBROMIDE 10 MG: 20 TABLET ORAL at 09:42

## 2019-12-04 RX ADMIN — SODIUM CHLORIDE 100 ML: 900 INJECTION, SOLUTION INTRAVENOUS at 08:26

## 2019-12-04 RX ADMIN — DEXAMETHASONE SODIUM PHOSPHATE 6 MG: 4 INJECTION, SOLUTION INTRAMUSCULAR; INTRAVENOUS at 14:52

## 2019-12-04 RX ADMIN — GADOBUTROL 7 ML: 604.72 INJECTION INTRAVENOUS at 08:25

## 2019-12-04 RX ADMIN — HYDROMORPHONE HYDROCHLORIDE 1 MG: 1 INJECTION, SOLUTION INTRAMUSCULAR; INTRAVENOUS; SUBCUTANEOUS at 20:03

## 2019-12-04 RX ADMIN — LORAZEPAM 1 MG: 1 TABLET ORAL at 07:16

## 2019-12-04 RX ADMIN — FENTANYL CITRATE 25 MCG: 50 INJECTION, SOLUTION INTRAMUSCULAR; INTRAVENOUS at 16:28

## 2019-12-04 RX ADMIN — ONDANSETRON 4 MG: 2 INJECTION INTRAMUSCULAR; INTRAVENOUS at 16:30

## 2019-12-04 RX ADMIN — Medication 120 MCG: at 15:06

## 2019-12-04 RX ADMIN — MIDAZOLAM 2 MG: 1 INJECTION INTRAMUSCULAR; INTRAVENOUS at 14:33

## 2019-12-04 RX ADMIN — ONDANSETRON 4 MG: 2 INJECTION INTRAMUSCULAR; INTRAVENOUS at 03:37

## 2019-12-04 RX ADMIN — Medication 10 ML: at 21:00

## 2019-12-04 RX ADMIN — FENTANYL CITRATE 75 MCG: 50 INJECTION, SOLUTION INTRAMUSCULAR; INTRAVENOUS at 14:44

## 2019-12-04 RX ADMIN — LIDOCAINE HYDROCHLORIDE 50 MG: 20 INJECTION, SOLUTION EPIDURAL; INFILTRATION; INTRACAUDAL; PERINEURAL at 14:44

## 2019-12-04 RX ADMIN — SODIUM CHLORIDE, SODIUM LACTATE, POTASSIUM CHLORIDE, AND CALCIUM CHLORIDE 25 ML/HR: 600; 310; 30; 20 INJECTION, SOLUTION INTRAVENOUS at 14:17

## 2019-12-04 RX ADMIN — NEOSTIGMINE METHYLSULFATE 3 MG: 1 INJECTION, SOLUTION INTRAMUSCULAR; INTRAVENOUS; SUBCUTANEOUS at 15:50

## 2019-12-04 RX ADMIN — FENTANYL CITRATE 25 MCG: 50 INJECTION, SOLUTION INTRAMUSCULAR; INTRAVENOUS at 16:34

## 2019-12-04 RX ADMIN — CEFOTETAN DISODIUM 2 G: 2 INJECTION, POWDER, FOR SOLUTION INTRAMUSCULAR; INTRAVENOUS at 14:49

## 2019-12-04 RX ADMIN — GLYCOPYRROLATE 0.3 MG: 0.2 INJECTION, SOLUTION INTRAMUSCULAR; INTRAVENOUS at 15:50

## 2019-12-04 RX ADMIN — FENTANYL CITRATE 25 MCG: 50 INJECTION, SOLUTION INTRAMUSCULAR; INTRAVENOUS at 15:56

## 2019-12-04 RX ADMIN — ROCURONIUM BROMIDE 5 MG: 10 SOLUTION INTRAVENOUS at 14:44

## 2019-12-04 RX ADMIN — Medication 80 MCG: at 15:33

## 2019-12-04 RX ADMIN — SODIUM CHLORIDE, POTASSIUM CHLORIDE, SODIUM LACTATE AND CALCIUM CHLORIDE: 600; 310; 30; 20 INJECTION, SOLUTION INTRAVENOUS at 14:17

## 2019-12-04 RX ADMIN — FENTANYL CITRATE 25 MCG: 50 INJECTION, SOLUTION INTRAMUSCULAR; INTRAVENOUS at 16:44

## 2019-12-04 RX ADMIN — ESTRADIOL 2 MG: 1 TABLET ORAL at 09:41

## 2019-12-04 RX ADMIN — Medication 80 MCG: at 15:23

## 2019-12-04 RX ADMIN — PANTOPRAZOLE SODIUM 40 MG: 40 TABLET, DELAYED RELEASE ORAL at 09:41

## 2019-12-04 RX ADMIN — ONDANSETRON HYDROCHLORIDE 4 MG: 2 INJECTION, SOLUTION INTRAMUSCULAR; INTRAVENOUS at 15:48

## 2019-12-04 RX ADMIN — PROPOFOL 150 MG: 10 INJECTION, EMULSION INTRAVENOUS at 14:44

## 2019-12-04 RX ADMIN — SODIUM CHLORIDE, SODIUM LACTATE, POTASSIUM CHLORIDE, AND CALCIUM CHLORIDE 125 ML/HR: 600; 310; 30; 20 INJECTION, SOLUTION INTRAVENOUS at 03:19

## 2019-12-04 RX ADMIN — SODIUM CHLORIDE, SODIUM LACTATE, POTASSIUM CHLORIDE, AND CALCIUM CHLORIDE 125 ML/HR: 600; 310; 30; 20 INJECTION, SOLUTION INTRAVENOUS at 16:34

## 2019-12-04 RX ADMIN — SUCCINYLCHOLINE CHLORIDE 120 MG: 20 INJECTION, SOLUTION INTRAMUSCULAR; INTRAVENOUS at 14:44

## 2019-12-04 RX ADMIN — SODIUM CHLORIDE, POTASSIUM CHLORIDE, SODIUM LACTATE AND CALCIUM CHLORIDE: 600; 310; 30; 20 INJECTION, SOLUTION INTRAVENOUS at 15:26

## 2019-12-04 RX ADMIN — ONDANSETRON 4 MG: 2 INJECTION INTRAMUSCULAR; INTRAVENOUS at 20:17

## 2019-12-04 RX ADMIN — HYDROMORPHONE HYDROCHLORIDE 0.5 MG: 1 INJECTION, SOLUTION INTRAMUSCULAR; INTRAVENOUS; SUBCUTANEOUS at 16:55

## 2019-12-04 NOTE — PROGRESS NOTES
Reason for Admission:   Abdominal pain                   RRAT Score:          2/ low           Plan for utilizing home health: At this point, there does [facility]. Current Advanced Directive/Advance Care Plan:                        No code status on file on advanced care plan on file  Transition of Care Plan:                    TBD/ CM met with patient an explained CM role. There does not appear to be any CM needs. Family to transport at discharge.     Fabricio Ortega  12:33 PM

## 2019-12-04 NOTE — PERIOP NOTES
TRANSFER - OUT REPORT:    Verbal report given to RN on Hood Mckinney  being transferred to  for routine post - op       Report consisted of patients Situation, Background, Assessment and   Recommendations(SBAR). Time Pre op antibiotic given:1449  Anesthesia Stop time: 1656  [place] Present on Transfer to floor:no  Order for [place] on Chart:no  Discharge Prescriptions with Chart:no    Information from the following report(s) SBAR, OR Summary, Intake/Output and MAR was reviewed with the receiving nurse. Opportunity for questions and clarification was provided. Is the patient on 02? YES       L/Min 1       Other -    Is the patient on a monitor? NO    Is the nurse transporting with the patient? NO    Surgical Waiting Area notified of patient's transfer from PACU? NO one in surgical waiting      The following personal items collected during your admission accompanied patient upon transfer:   Dental Appliance: Dental Appliances: None  Vision: Visual Aid: Glasses, With patient  Hearing Aid:    Jewelry: Jewelry: With patient  Clothing: Clothing: At bedside  Other Valuables:  Other Valuables: Cell Phone, Eyeglasses  Valuables sent to safe:

## 2019-12-04 NOTE — PROGRESS NOTES
.. TRANSFER - IN REPORT:    Verbal report received from LEILANI Sutherland (name) on Laura Carrington  being received from [facility] (unit) for routine progression of care      Report consisted of patients Situation, Background, Assessment and   Recommendations(SBAR). Information from the following report(s) SBAR, Kardex, Intake/Output and MAR was reviewed with the receiving nurse. Opportunity for questions and clarification was provided. Assessment completed upon patients arrival to unit and care assumed.

## 2019-12-04 NOTE — CONSULTS
Chief Complaint:  Biliary pancreatitis    HPI:  64 yo woman with significant PMH for GERD, anxiety who presented to [place] ER with abdominal pain. Pt reported pain developed on Sunday. Pain has been in epigastric and RUQ. Pt reported nausea and vomiting. She denied any fevers or chills. She had RUQ US which showed cholelithiasis with dilated bile duct. Her lipase and LEFT were elevated concerning for biliary pancreatitis. Pt had hx hysterectomy complicated by recurrent cysts. Past Medical History:   Diagnosis Date    Anxiety     Chronic UTI (urinary tract infection)     GERD (gastroesophageal reflux disease)     Hormone replacement therapy     Hyperlipidemia, mixed      Past Surgical History:   Procedure Laterality Date    HX COLONOSCOPY  10/31/2018    HX ENDOSCOPY  10/31/2018    HX HYSTERECTOMY Bilateral     at age 33, total hysterectomy     HX SALPINGO-OOPHORECTOMY      age 33. No current facility-administered medications on file prior to encounter. Current Outpatient Medications on File Prior to Encounter   Medication Sig Dispense Refill    estradiol (VAGIFEM) 10 mcg tab vaginal tablet Insert 10 mcg into vagina two (2) times a week.  conjugated estrogens (PREMARIN) 0.625 mg/gram vaginal cream Apply 0.5 g to affected area daily. 30 g 2    estradiol (ESTRACE) 2 mg tablet Take  by mouth daily.  citalopram (CELEXA) 10 mg tablet Take  by mouth daily.  esomeprazole (NEXIUM) 20 mg capsule Take  by mouth daily.          No Known Allergies    Review of Systems - General ROS: negative  Psychological ROS: negative  Respiratory ROS: negative  Cardiovascular ROS: negative  Gastrointestinal ROS: positive for - abdominal pain     Visit Vitals  /67 (BP 1 Location: Right arm, BP Patient Position: At rest)   Pulse 85   Temp 97.5 °F (36.4 °C)   Resp 16   Ht 5' 5\" (1.651 m)   Wt 154 lb 5.2 oz (70 kg)   SpO2 95%   Breastfeeding No   BMI 25.68 kg/m²          Physical Exam: Gen:  NAD  Pulm:  Unlabored  Abd:  S/ND/mild TTP in epigastric and RUQ    Recent Results (from the past 24 hour(s))   EKG, 12 LEAD, INITIAL    Collection Time: 12/03/19  2:13 PM   Result Value Ref Range    Ventricular Rate 100 BPM    Atrial Rate 100 BPM    P-R Interval 130 ms    QRS Duration 70 ms    Q-T Interval 342 ms    QTC Calculation (Bezet) 441 ms    Calculated P Axis 60 degrees    Calculated R Axis 60 degrees    Calculated T Axis 28 degrees    Diagnosis       Normal sinus rhythm  ST & T wave abnormality, consider inferior ischemia  Abnormal ECG  No previous ECGs available     SAMPLES BEING HELD    Collection Time: 12/03/19  2:23 PM   Result Value Ref Range    SAMPLES BEING HELD 1RED, 1BLUE     COMMENT        Add-on orders for these samples will be processed based on acceptable specimen integrity and analyte stability, which may vary by analyte. CBC WITH AUTOMATED DIFF    Collection Time: 12/03/19  2:23 PM   Result Value Ref Range    WBC 4.8 3.6 - 11.0 K/uL    RBC 4.40 3.80 - 5.20 M/uL    HGB 13.0 11.5 - 16.0 g/dL    HCT 38.7 35.0 - 47.0 %    MCV 88.0 80.0 - 99.0 FL    MCH 29.5 26.0 - 34.0 PG    MCHC 33.6 30.0 - 36.5 g/dL    RDW 12.3 11.5 - 14.5 %    PLATELET 980 586 - 016 K/uL    MPV 8.8 (L) 8.9 - 12.9 FL    NRBC 0.0 0  WBC    ABSOLUTE NRBC 0.00 0.00 - 0.01 K/uL    NEUTROPHILS 62 32 - 75 %    LYMPHOCYTES 25 12 - 49 %    MONOCYTES 9 5 - 13 %    EOSINOPHILS 3 0 - 7 %    BASOPHILS 1 0 - 1 %    IMMATURE GRANULOCYTES 0 0.0 - 0.5 %    ABS. NEUTROPHILS 3.0 1.8 - 8.0 K/UL    ABS. LYMPHOCYTES 1.2 0.8 - 3.5 K/UL    ABS. MONOCYTES 0.4 0.0 - 1.0 K/UL    ABS. EOSINOPHILS 0.1 0.0 - 0.4 K/UL    ABS. BASOPHILS 0.0 0.0 - 0.1 K/UL    ABS. IMM.  GRANS. 0.0 0.00 - 0.04 K/UL    DF AUTOMATED     METABOLIC PANEL, COMPREHENSIVE    Collection Time: 12/03/19  2:23 PM   Result Value Ref Range    Sodium 139 136 - 145 mmol/L    Potassium 3.6 3.5 - 5.1 mmol/L    Chloride 100 97 - 108 mmol/L    CO2 31 21 - 32 mmol/L    Anion gap 8 5 - 15 mmol/L    Glucose 108 (H) 65 - 100 mg/dL    BUN 12 6 - 20 MG/DL    Creatinine 0.94 0.55 - 1.02 MG/DL    BUN/Creatinine ratio 13 12 - 20      GFR est AA >60 >60 ml/min/1.73m2    GFR est non-AA >60 >60 ml/min/1.73m2    Calcium 9.5 8.5 - 10.1 MG/DL    Bilirubin, total 0.9 0.2 - 1.0 MG/DL    ALT (SGPT) 463 (H) 12 - 78 U/L    AST (SGOT) 411 (H) 15 - 37 U/L    Alk. phosphatase 244 (H) 45 - 117 U/L    Protein, total 8.0 6.4 - 8.2 g/dL    Albumin 4.0 3.5 - 5.0 g/dL    Globulin 4.0 2.0 - 4.0 g/dL    A-G Ratio 1.0 (L) 1.1 - 2.2     TROPONIN I    Collection Time: 12/03/19  2:23 PM   Result Value Ref Range    Troponin-I, Qt. <0.05 <0.05 ng/mL   LIPASE    Collection Time: 12/03/19  2:23 PM   Result Value Ref Range    Lipase 297 73 - 393 U/L   LACTIC ACID    Collection Time: 12/03/19  4:39 PM   Result Value Ref Range    Lactic acid 0.5 0.4 - 2.0 MMOL/L       AP: 62 yo woman with biliary pancreatitis    - Biliary pancreatitis:  Will obtain MRCP to evaluate for biliary obstruction  - To OR for laparoscopic cholecystectomy with intra-operative cholangiogram, possible open if MRCP showed no gallstone  - Risks and benefits explained.   Pt wish to pursue with operation  - Zosyn antibiotic

## 2019-12-04 NOTE — PROGRESS NOTES
Problem: Falls - Risk of  Goal: *Absence of Falls  Description  Document Sushma Lucero Fall Risk and appropriate interventions in the flowsheet. Outcome: Progressing Towards Goal  Note: Fall Risk Interventions:            Medication Interventions: Patient to call before getting OOB                   Problem: Patient Education: Go to Patient Education Activity  Goal: Patient/Family Education  Outcome: Progressing Towards Goal     Problem: Pain  Goal: *Control of Pain  Outcome: Progressing Towards Goal     Problem: Patient Education: Go to Patient Education Activity  Goal: Patient/Family Education  Outcome: Progressing Towards Goal     Problem: Pressure Injury - Risk of  Goal: *Prevention of pressure injury  Description  Document Vipin Scale and appropriate interventions in the flowsheet. Outcome: Progressing Towards Goal  Note: Pressure Injury Interventions:             Activity Interventions: Increase time out of bed         Nutrition Interventions: Document food/fluid/supplement intake                     Problem: Patient Education: Go to Patient Education Activity  Goal: Patient/Family Education  Outcome: Progressing Towards Goal

## 2019-12-04 NOTE — PROGRESS NOTES
Complains of headache but otherwise feels OK. LFT's improving; MRCP with 8 mm CBD but no stones. Plan laparoscopic cholecystectomy with cholangiogram. Technical aspects of procedure reviewed along with risks to include but not limited to bleeding, infection, bile duct injury, open procedure. She understands and desires to proceed. All questions answered.

## 2019-12-04 NOTE — ANESTHESIA PREPROCEDURE EVALUATION
Relevant Problems   No relevant active problems       Anesthetic History   No history of anesthetic complications            Review of Systems / Medical History  Patient summary reviewed, nursing notes reviewed and pertinent labs reviewed    Pulmonary  Within defined limits                 Neuro/Psych   Within defined limits      Psychiatric history     Cardiovascular  Within defined limits            Hyperlipidemia    Exercise tolerance: >4 METS     GI/Hepatic/Renal  Within defined limits   GERD           Endo/Other  Within defined limits           Other Findings              Physical Exam    Airway  Mallampati: II  TM Distance: 4 - 6 cm  Neck ROM: normal range of motion   Mouth opening: Normal     Cardiovascular  Regular rate and rhythm,  S1 and S2 normal,  no murmur, click, rub, or gallop             Dental  No notable dental hx       Pulmonary  Breath sounds clear to auscultation               Abdominal  GI exam deferred       Other Findings            Anesthetic Plan    ASA: 2  Anesthesia type: general            Anesthetic plan and risks discussed with: Patient

## 2019-12-04 NOTE — ANESTHESIA POSTPROCEDURE EVALUATION
Procedure(s):  CHOLECYSTECTOMY LAPAROSCOPIC With grams, possible open. general    Anesthesia Post Evaluation        Patient location during evaluation: PACU  Note status: Adequate. Level of consciousness: responsive to verbal stimuli and sleepy but conscious  Pain management: satisfactory to patient  Airway patency: patent  Anesthetic complications: no  Cardiovascular status: acceptable  Respiratory status: acceptable  Hydration status: acceptable  Comments: +Post-Anesthesia Evaluation and Assessment    Patient: Jodi Castro MRN: [id number]  SSN: [id number]   YOB: 1960  Age: 63 y.o. Sex: female          Cardiovascular Function/Vital Signs    /65   Pulse 86   Temp 37 °C (98.6 °F)   Resp 13   Ht 5' 5\" (1.651 m)   Wt 70 kg (154 lb 5.2 oz)   SpO2 98%   Breastfeeding No   BMI 25.68 kg/m²     Patient is status post Procedure(s):  CHOLECYSTECTOMY LAPAROSCOPIC With grams, possible open. Nausea/Vomiting: Controlled. Postoperative hydration reviewed and adequate. Pain:  Pain Scale 1: Numeric (0 - 10) (12/04/19 1634)  Pain Intensity 1: 5 (12/04/19 1634)   Managed. Neurological Status: At baseline. Mental Status and Level of Consciousness: Arousable. Pulmonary Status:   O2 Device: Nasal cannula;Oral airway (12/04/19 1615)   Adequate oxygenation and airway patent. Complications related to anesthesia: None    Post-anesthesia assessment completed. No concerns. I have evaluated the patient and the patient is stable and ready to be discharged from PACU . Signed By: Gustavo Aguiar MD    12/4/2019        Vitals Value Taken Time   /65 12/4/2019  4:30 PM   Temp 37 °C (98.6 °F) 12/4/2019  4:15 PM   Pulse 89 12/4/2019  4:41 PM   Resp 12 12/4/2019  4:41 PM   SpO2 98 % 12/4/2019  4:41 PM   Vitals shown include unvalidated device data.

## 2019-12-04 NOTE — PROGRESS NOTES
Bedside and Verbal shift change report given to Desi DUKE (oncoming nurse) by Ramon Lenz (offgoing nurse). Report included the following information SBAR, Kardex, Procedure Summary, Intake/Output, MAR, Accordion and Recent Results.

## 2019-12-04 NOTE — PROGRESS NOTES
Pt for a lateral transfer. TRANSFER - OUT REPORT:    Verbal report given to Hannah DUKE(name) on Grazyna Monroe  being transferred to [facility](unit) for routine progression of care       Report consisted of patients Situation, Background, Assessment and   Recommendations(SBAR). Information from the following report(s) SBAR, Kardex, Intake/Output, MAR and Recent Results was reviewed with the receiving nurse. Lines:   Peripheral IV 12/03/19 Left Antecubital (Active)   Site Assessment Clean, dry, & intact 12/4/2019  8:39 AM   Phlebitis Assessment 0 12/4/2019  8:39 AM   Infiltration Assessment 0 12/4/2019  8:39 AM   Dressing Status Clean, dry, & intact 12/4/2019  8:39 AM   Dressing Type Tape;Transparent 12/4/2019  8:39 AM   Hub Color/Line Status Pink;Flushed 12/4/2019  8:39 AM   Action Taken Open ports on tubing capped 12/4/2019  8:39 AM   Alcohol Cap Used Yes 12/4/2019  8:39 AM        Opportunity for questions and clarification was provided.       Patient transported with:   Ellie

## 2019-12-04 NOTE — H&P
Chief Complaint:  Biliary pancreatitis    HPI:  62 yo woman with significant PMH for GERD, anxiety who presented to [place] ER with abdominal pain. Pt reported pain developed on Sunday. Pain has been in epigastric and RUQ. Pt reported nausea and vomiting. She denied any fevers or chills. She had RUQ US which showed cholelithiasis with dilated bile duct. Her lipase and LEFT were elevated concerning for biliary pancreatitis. Pt had hx hysterectomy complicated by recurrent cysts. Past Medical History:   Diagnosis Date    Anxiety     Chronic UTI (urinary tract infection)     GERD (gastroesophageal reflux disease)     Hormone replacement therapy     Hyperlipidemia, mixed      Past Surgical History:   Procedure Laterality Date    HX COLONOSCOPY  10/31/2018    HX ENDOSCOPY  10/31/2018    HX HYSTERECTOMY Bilateral     at age 33, total hysterectomy     HX SALPINGO-OOPHORECTOMY      age 33. No current facility-administered medications on file prior to encounter. Current Outpatient Medications on File Prior to Encounter   Medication Sig Dispense Refill    estradiol (VAGIFEM) 10 mcg tab vaginal tablet Insert 10 mcg into vagina two (2) times a week.  conjugated estrogens (PREMARIN) 0.625 mg/gram vaginal cream Apply 0.5 g to affected area daily. 30 g 2    estradiol (ESTRACE) 2 mg tablet Take  by mouth daily.  citalopram (CELEXA) 10 mg tablet Take  by mouth daily.  esomeprazole (NEXIUM) 20 mg capsule Take  by mouth daily.          No Known Allergies    Review of Systems - General ROS: negative  Psychological ROS: negative  Respiratory ROS: negative  Cardiovascular ROS: negative  Gastrointestinal ROS: positive for - abdominal pain     Visit Vitals  BP 96/56 (BP 1 Location: Left arm, BP Patient Position: At rest)   Pulse 80   Temp 98.1 °F (36.7 °C)   Resp 16   Ht 5' 5\" (1.651 m)   Wt 154 lb 5.2 oz (70 kg)   SpO2 100%   Breastfeeding No   BMI 25.68 kg/m²          Physical Exam: Gen:  NAD  Pulm:  Unlabored  Abd:  S/ND/mild TTP in epigastric and RUQ    Recent Results (from the past 24 hour(s))   EKG, 12 LEAD, INITIAL    Collection Time: 12/03/19  2:13 PM   Result Value Ref Range    Ventricular Rate 100 BPM    Atrial Rate 100 BPM    P-R Interval 130 ms    QRS Duration 70 ms    Q-T Interval 342 ms    QTC Calculation (Bezet) 441 ms    Calculated P Axis 60 degrees    Calculated R Axis 60 degrees    Calculated T Axis 28 degrees    Diagnosis       Normal sinus rhythm  ST & T wave abnormality, consider inferior ischemia  Abnormal ECG  Confirmed by Yen Moser M.D., Theadore Jester ([id number]) on 12/4/2019 5:30:04 AM     SAMPLES BEING HELD    Collection Time: 12/03/19  2:23 PM   Result Value Ref Range    SAMPLES BEING HELD 1RED, 1BLUE     COMMENT        Add-on orders for these samples will be processed based on acceptable specimen integrity and analyte stability, which may vary by analyte. CBC WITH AUTOMATED DIFF    Collection Time: 12/03/19  2:23 PM   Result Value Ref Range    WBC 4.8 3.6 - 11.0 K/uL    RBC 4.40 3.80 - 5.20 M/uL    HGB 13.0 11.5 - 16.0 g/dL    HCT 38.7 35.0 - 47.0 %    MCV 88.0 80.0 - 99.0 FL    MCH 29.5 26.0 - 34.0 PG    MCHC 33.6 30.0 - 36.5 g/dL    RDW 12.3 11.5 - 14.5 %    PLATELET 254 921 - 227 K/uL    MPV 8.8 (L) 8.9 - 12.9 FL    NRBC 0.0 0  WBC    ABSOLUTE NRBC 0.00 0.00 - 0.01 K/uL    NEUTROPHILS 62 32 - 75 %    LYMPHOCYTES 25 12 - 49 %    MONOCYTES 9 5 - 13 %    EOSINOPHILS 3 0 - 7 %    BASOPHILS 1 0 - 1 %    IMMATURE GRANULOCYTES 0 0.0 - 0.5 %    ABS. NEUTROPHILS 3.0 1.8 - 8.0 K/UL    ABS. LYMPHOCYTES 1.2 0.8 - 3.5 K/UL    ABS. MONOCYTES 0.4 0.0 - 1.0 K/UL    ABS. EOSINOPHILS 0.1 0.0 - 0.4 K/UL    ABS. BASOPHILS 0.0 0.0 - 0.1 K/UL    ABS. IMM.  GRANS. 0.0 0.00 - 0.04 K/UL    DF AUTOMATED     METABOLIC PANEL, COMPREHENSIVE    Collection Time: 12/03/19  2:23 PM   Result Value Ref Range    Sodium 139 136 - 145 mmol/L    Potassium 3.6 3.5 - 5.1 mmol/L    Chloride 100 97 - 108 mmol/L    CO2 31 21 - 32 mmol/L    Anion gap 8 5 - 15 mmol/L    Glucose 108 (H) 65 - 100 mg/dL    BUN 12 6 - 20 MG/DL    Creatinine 0.94 0.55 - 1.02 MG/DL    BUN/Creatinine ratio 13 12 - 20      GFR est AA >60 >60 ml/min/1.73m2    GFR est non-AA >60 >60 ml/min/1.73m2    Calcium 9.5 8.5 - 10.1 MG/DL    Bilirubin, total 0.9 0.2 - 1.0 MG/DL    ALT (SGPT) 463 (H) 12 - 78 U/L    AST (SGOT) 411 (H) 15 - 37 U/L    Alk. phosphatase 244 (H) 45 - 117 U/L    Protein, total 8.0 6.4 - 8.2 g/dL    Albumin 4.0 3.5 - 5.0 g/dL    Globulin 4.0 2.0 - 4.0 g/dL    A-G Ratio 1.0 (L) 1.1 - 2.2     TROPONIN I    Collection Time: 12/03/19  2:23 PM   Result Value Ref Range    Troponin-I, Qt. <0.05 <0.05 ng/mL   LIPASE    Collection Time: 12/03/19  2:23 PM   Result Value Ref Range    Lipase 297 73 - 393 U/L   LACTIC ACID    Collection Time: 12/03/19  4:39 PM   Result Value Ref Range    Lactic acid 0.5 0.4 - 2.0 MMOL/L   CBC W/O DIFF    Collection Time: 12/04/19  3:22 AM   Result Value Ref Range    WBC 4.6 3.6 - 11.0 K/uL    RBC 3.85 3.80 - 5.20 M/uL    HGB 11.6 11.5 - 16.0 g/dL    HCT 35.0 35.0 - 47.0 %    MCV 90.9 80.0 - 99.0 FL    MCH 30.1 26.0 - 34.0 PG    MCHC 33.1 30.0 - 36.5 g/dL    RDW 12.5 11.5 - 14.5 %    PLATELET 815 938 - 700 K/uL    MPV 8.9 8.9 - 12.9 FL    NRBC 0.0 0  WBC    ABSOLUTE NRBC 0.00 0.00 - 4.68 K/uL   METABOLIC PANEL, COMPREHENSIVE    Collection Time: 12/04/19  3:22 AM   Result Value Ref Range    Sodium 140 136 - 145 mmol/L    Potassium 3.7 3.5 - 5.1 mmol/L    Chloride 107 97 - 108 mmol/L    CO2 27 21 - 32 mmol/L    Anion gap 6 5 - 15 mmol/L    Glucose 90 65 - 100 mg/dL    BUN 9 6 - 20 MG/DL    Creatinine 0.80 0.55 - 1.02 MG/DL    BUN/Creatinine ratio 11 (L) 12 - 20      GFR est AA >60 >60 ml/min/1.73m2    GFR est non-AA >60 >60 ml/min/1.73m2    Calcium 8.4 (L) 8.5 - 10.1 MG/DL    Bilirubin, total 0.8 0.2 - 1.0 MG/DL    ALT (SGPT) 398 (H) 12 - 78 U/L    AST (SGOT) 353 (H) 15 - 37 U/L    Alk.  phosphatase 226 (H) 45 - 117 U/L    Protein, total 6.3 (L) 6.4 - 8.2 g/dL    Albumin 3.0 (L) 3.5 - 5.0 g/dL    Globulin 3.3 2.0 - 4.0 g/dL    A-G Ratio 0.9 (L) 1.1 - 2.2     LIPASE    Collection Time: 12/04/19  3:22 AM   Result Value Ref Range    Lipase 165 73 - 393 U/L       AP: 64 yo woman with biliary pancreatitis    - Biliary pancreatitis:  Will obtain MRCP to evaluate for biliary obstruction  - To OR for laparoscopic cholecystectomy with intra-operative cholangiogram, possible open if MRCP showed no gallstone  - Risks and benefits explained.   Pt wish to pursue with operation  - Zosyn antibiotic

## 2019-12-04 NOTE — PROGRESS NOTES
.. TRANSFER - IN REPORT:    Verbal report received from Holli (name) on Juan Smalls  being received from PACU (unit) for routine progression of care      Report consisted of patients Situation, Background, Assessment and   Recommendations(SBAR). Information from the following report(s) SBAR, Kardex and MAR was reviewed with the receiving nurse. Opportunity for questions and clarification was provided. Assessment completed upon patients arrival to unit and care assumed.

## 2019-12-04 NOTE — PERIOP NOTES
Patient: Hood Mckinney MRN: [id number]  SSN: [id number]   YOB: 1960  Age: 63 y.o. Sex: female     Patient is status post Procedure(s):  CHOLECYSTECTOMY LAPAROSCOPIC With grams, possible open. Surgeon(s) and Role:     Suman Cano MD - Primary    Local/Dose/Irrigation:  See [facility]                  Peripheral IV 12/03/19 Left Antecubital (Active)   Site Assessment Clean, dry, & intact 12/4/2019  1:54 PM   Phlebitis Assessment 0 12/4/2019  1:54 PM   Infiltration Assessment 0 12/4/2019  1:54 PM   Dressing Status Clean, dry, & intact 12/4/2019  1:54 PM   Dressing Type Tape;Transparent 12/4/2019  1:54 PM   Hub Color/Line Status Pink; Infusing 12/4/2019  1:54 PM   Action Taken Open ports on tubing capped 12/4/2019  1:54 PM   Alcohol Cap Used Yes 12/4/2019  1:54 PM            Airway - Endotracheal Tube 12/04/19 Oral (Active)                   Dressing/Packing:  Wound Abdomen-Dressing Type: Topical skin adhesive/glue(x4 trocar sites) (12/04/19 1500)    Splint/Cast:  ]    Other:

## 2019-12-04 NOTE — BRIEF OP NOTE
BRIEF OPERATIVE NOTE    Date of Procedure: 12/4/2019   Preoperative Diagnosis: CHRONIC CHOLECYSTITIS  Postoperative Diagnosis: CHRONIC CHOLECYSTITIS  Procedure(s):  CHOLECYSTECTOMY LAPAROSCOPIC WITH IOC  Surgeon(s) and Role:     Ernie Christensen MD - Primary         Surgical Assistant: Donna Laird    Surgical Staff:  Circ-1: Kori Londono RN  Radiology Technician: Tiffani Pedro  Scrub Tech-1: Bill DE LEON  Scrub Tech-Relief:  Evans Level  Surg Asst-1: Jeannette Tyler Time In Time Out   Incision Start 12/04/2019 1454    Incision Close       Anesthesia: General   Estimated Blood Loss: 50 cc  Specimens:   ID Type Source Tests Collected by Time Destination   1 : gallbladder Fresh Gallbladder  Mane Kimble MD 12/4/2019 1502 Pathology      Findings: chronic cholecystitis; normal IOC   Complications: none  Implants: * No implants in log *

## 2019-12-04 NOTE — PROGRESS NOTES
Pt  @ MRI during shift change. Bedside and Verbal shift change report given to Josep Trotter RN (oncoming nurse) by CARLITA Bangura RN (offgoing nurse). Report included the following information SBAR, Kardex, Intake/Output, MAR and Recent Results.

## 2019-12-04 NOTE — PROGRESS NOTES
Bedside and Verbal shift change report given to Desi DUKE (oncoming nurse) by Jg Tafoya (offgoing nurse). Report included the following information SBAR, Kardex, Procedure Summary, Intake/Output, MAR, Accordion and Recent Results. [id number] called to request screening form be completed [id number]- MRI

## 2019-12-05 LAB
ALBUMIN SERPL-MCNC: 2.9 G/DL (ref 3.5–5)
ALBUMIN/GLOB SERPL: 0.9 {RATIO} (ref 1.1–2.2)
ALP SERPL-CCNC: 229 U/L (ref 45–117)
ALT SERPL-CCNC: 316 U/L (ref 12–78)
ANION GAP SERPL CALC-SCNC: 6 MMOL/L (ref 5–15)
AST SERPL-CCNC: 223 U/L (ref 15–37)
BASOPHILS # BLD: 0 K/UL (ref 0–0.1)
BASOPHILS NFR BLD: 0 % (ref 0–1)
BILIRUB SERPL-MCNC: 0.7 MG/DL (ref 0.2–1)
BUN SERPL-MCNC: 11 MG/DL (ref 6–20)
BUN/CREAT SERPL: 13 (ref 12–20)
CALCIUM SERPL-MCNC: 8.6 MG/DL (ref 8.5–10.1)
CHLORIDE SERPL-SCNC: 103 MMOL/L (ref 97–108)
CO2 SERPL-SCNC: 28 MMOL/L (ref 21–32)
CREAT SERPL-MCNC: 0.85 MG/DL (ref 0.55–1.02)
DIFFERENTIAL METHOD BLD: ABNORMAL
EOSINOPHIL # BLD: 0 K/UL (ref 0–0.4)
EOSINOPHIL NFR BLD: 0 % (ref 0–7)
ERYTHROCYTE [DISTWIDTH] IN BLOOD BY AUTOMATED COUNT: 12.2 % (ref 11.5–14.5)
GLOBULIN SER CALC-MCNC: 3.1 G/DL (ref 2–4)
GLUCOSE SERPL-MCNC: 121 MG/DL (ref 65–100)
HCT VFR BLD AUTO: 34.9 % (ref 35–47)
HGB BLD-MCNC: 11.2 G/DL (ref 11.5–16)
IMM GRANULOCYTES # BLD AUTO: 0 K/UL (ref 0–0.04)
IMM GRANULOCYTES NFR BLD AUTO: 0 % (ref 0–0.5)
LIPASE SERPL-CCNC: >3000 U/L (ref 73–393)
LYMPHOCYTES # BLD: 0.7 K/UL (ref 0.8–3.5)
LYMPHOCYTES NFR BLD: 10 % (ref 12–49)
MCH RBC QN AUTO: 29.4 PG (ref 26–34)
MCHC RBC AUTO-ENTMCNC: 32.1 G/DL (ref 30–36.5)
MCV RBC AUTO: 91.6 FL (ref 80–99)
MONOCYTES # BLD: 0.4 K/UL (ref 0–1)
MONOCYTES NFR BLD: 6 % (ref 5–13)
NEUTS SEG # BLD: 6.2 K/UL (ref 1.8–8)
NEUTS SEG NFR BLD: 84 % (ref 32–75)
NRBC # BLD: 0 K/UL (ref 0–0.01)
NRBC BLD-RTO: 0 PER 100 WBC
PLATELET # BLD AUTO: 262 K/UL (ref 150–400)
PMV BLD AUTO: 8.5 FL (ref 8.9–12.9)
POTASSIUM SERPL-SCNC: 4 MMOL/L (ref 3.5–5.1)
PROT SERPL-MCNC: 6 G/DL (ref 6.4–8.2)
RBC # BLD AUTO: 3.81 M/UL (ref 3.8–5.2)
RBC MORPH BLD: ABNORMAL
SODIUM SERPL-SCNC: 137 MMOL/L (ref 136–145)
WBC # BLD AUTO: 7.3 K/UL (ref 3.6–11)

## 2019-12-05 PROCEDURE — 80053 COMPREHEN METABOLIC PANEL: CPT

## 2019-12-05 PROCEDURE — 85025 COMPLETE CBC W/AUTO DIFF WBC: CPT

## 2019-12-05 PROCEDURE — 65270000032 HC RM SEMIPRIVATE

## 2019-12-05 PROCEDURE — 77030027138 HC INCENT SPIROMETER -A

## 2019-12-05 PROCEDURE — 74011250636 HC RX REV CODE- 250/636: Performed by: SURGERY

## 2019-12-05 PROCEDURE — 36415 COLL VENOUS BLD VENIPUNCTURE: CPT

## 2019-12-05 PROCEDURE — 83690 ASSAY OF LIPASE: CPT

## 2019-12-05 PROCEDURE — 74011250637 HC RX REV CODE- 250/637: Performed by: SURGERY

## 2019-12-05 RX ORDER — HYDROMORPHONE HYDROCHLORIDE 2 MG/1
2 TABLET ORAL
Status: DISCONTINUED | OUTPATIENT
Start: 2019-12-05 | End: 2019-12-06 | Stop reason: HOSPADM

## 2019-12-05 RX ORDER — KETOROLAC TROMETHAMINE 30 MG/ML
15 INJECTION, SOLUTION INTRAMUSCULAR; INTRAVENOUS
Status: COMPLETED | OUTPATIENT
Start: 2019-12-05 | End: 2019-12-05

## 2019-12-05 RX ADMIN — HYDROMORPHONE HYDROCHLORIDE 0.5 MG: 1 INJECTION, SOLUTION INTRAMUSCULAR; INTRAVENOUS; SUBCUTANEOUS at 00:08

## 2019-12-05 RX ADMIN — SODIUM CHLORIDE, SODIUM LACTATE, POTASSIUM CHLORIDE, AND CALCIUM CHLORIDE 125 ML/HR: 600; 310; 30; 20 INJECTION, SOLUTION INTRAVENOUS at 20:48

## 2019-12-05 RX ADMIN — ONDANSETRON 4 MG: 2 INJECTION INTRAMUSCULAR; INTRAVENOUS at 08:33

## 2019-12-05 RX ADMIN — HYDROMORPHONE HYDROCHLORIDE 0.5 MG: 1 INJECTION, SOLUTION INTRAMUSCULAR; INTRAVENOUS; SUBCUTANEOUS at 21:00

## 2019-12-05 RX ADMIN — ESTRADIOL 2 MG: 1 TABLET ORAL at 09:50

## 2019-12-05 RX ADMIN — CITALOPRAM HYDROBROMIDE 10 MG: 20 TABLET ORAL at 09:50

## 2019-12-05 RX ADMIN — ONDANSETRON 4 MG: 2 INJECTION INTRAMUSCULAR; INTRAVENOUS at 16:08

## 2019-12-05 RX ADMIN — LORAZEPAM 1 MG: 1 TABLET ORAL at 18:36

## 2019-12-05 RX ADMIN — HYDROMORPHONE HYDROCHLORIDE 2 MG: 2 TABLET ORAL at 17:27

## 2019-12-05 RX ADMIN — ONDANSETRON 4 MG: 2 INJECTION INTRAMUSCULAR; INTRAVENOUS at 21:00

## 2019-12-05 RX ADMIN — HYDROMORPHONE HYDROCHLORIDE 1 MG: 1 INJECTION, SOLUTION INTRAMUSCULAR; INTRAVENOUS; SUBCUTANEOUS at 16:08

## 2019-12-05 RX ADMIN — HYDROMORPHONE HYDROCHLORIDE 2 MG: 2 TABLET ORAL at 08:33

## 2019-12-05 RX ADMIN — PANTOPRAZOLE SODIUM 40 MG: 40 TABLET, DELAYED RELEASE ORAL at 06:49

## 2019-12-05 RX ADMIN — KETOROLAC TROMETHAMINE 15 MG: 30 INJECTION, SOLUTION INTRAMUSCULAR at 09:49

## 2019-12-05 RX ADMIN — HYDROMORPHONE HYDROCHLORIDE 2 MG: 2 TABLET ORAL at 12:28

## 2019-12-05 RX ADMIN — ONDANSETRON 4 MG: 2 INJECTION INTRAMUSCULAR; INTRAVENOUS at 00:07

## 2019-12-05 RX ADMIN — SODIUM CHLORIDE, SODIUM LACTATE, POTASSIUM CHLORIDE, AND CALCIUM CHLORIDE 125 ML/HR: 600; 310; 30; 20 INJECTION, SOLUTION INTRAVENOUS at 01:30

## 2019-12-05 RX ADMIN — ONDANSETRON 4 MG: 2 INJECTION INTRAMUSCULAR; INTRAVENOUS at 04:27

## 2019-12-05 RX ADMIN — Medication 10 ML: at 21:01

## 2019-12-05 RX ADMIN — OXYCODONE HYDROCHLORIDE AND ACETAMINOPHEN 1 TABLET: 5; 325 TABLET ORAL at 04:28

## 2019-12-05 NOTE — OP NOTES
[address]   OPERATIVE REPORT    Name:  Rolan Rodriguez  MR#:  [id number]  :  1960  ACCOUNT #:  [de-identified]  DATE OF SERVICE:  2019      PREOPERATIVE DIAGNOSES:  1. Chronic cholecystitis. 2.  Elevated liver function tests. POSTOPERATIVE DIAGNOSES:  1. Chronic cholecystitis. 2.  Elevated liver function tests. PROCEDURE PERFORMED:  Laparoscopic cholecystectomy with intraoperative cholangiogram.    SURGEON:  Mima Cho MD    ASSISTANT:  David Orona SA    ANESTHESIA:  General endotracheal.    COMPLICATIONS:  None. SPECIMENS REMOVED:  Gallbladder with contents. IMPLANTS:  None. ESTIMATED BLOOD LOSS:  50 mL. DRAIN:  None. COUNTS:  Sponge count correct. Needle count correct. INDICATIONS:  The patient is a 61-year-old white female with a history of gastroesophageal reflux disease with abdominal pain. She presented to a local emergency department. She was noted to have cholelithiasis with common bile duct dilation on ultrasound, with simultaneous elevated liver function tests. She was transferred to [facility] for admission and further evaluation. Liver function tests began to improve this morning, and MRCP was negative for common bile duct stone. She is taken to the operating room today for laparoscopic cholecystectomy with cholangiogram.    FINDINGS:  1. Chronic cholecystitis with cholelithiasis. 2.  No filling defects on intraoperative cholangiogram.    PROCEDURE:  The patient was identified as the correct patient in the preoperative holding area and informed consent was confirmed. After answering the patient's remaining questions, she was taken to the operating room and placed on the operating room table in the supine position. Sequential compression devices were placed on both lower extremities.   Following the uneventful initiation of general anesthesia, she was carefully secured to the operating room table with footboard and safety straps in place. All potential pressure points were padded with eggcrate. Her abdomen was prepped and draped in the usual sterile fashion. Final time-out was performed, and it was confirmed she had received intravenous antibiotics. A 5-mm trocar was inserted through a small right upper quadrant skin incision using an Optiview technique. After confirming intraperitoneal location of the trocar tip, insufflation with carbon dioxide gas was initiated. Once adequate working sites had been developed, the 5-mm, 30-degree laparoscope was inserted. No signs of trocar injury were present. The gallbladder appeared distended. No adhesions from prior hysterectomy were evident. A 12-mm trocar was inserted through a small incision below the umbilicus at the superior extent of her prior lower midline scar using visual guidance with the laparoscope. The patient was placed in a steep reverse Trendelenburg position. Two additional 5-mm trocars were inserted through small upper abdominal incisions using identical technique. The gallbladder was grasped at the fundus and infundibulum with retraction oriented cephalad and laterally, thus opening Calot's triangle. Adhesions to the infundibulum were taken down using electrocautery and blunt dissection. Peritoneal tissue was stripped away from the infundibulum in the direction of the portal structures. This allowed identification of the cystic artery and cystic duct. Each structure was circumferentially dissected free from surrounding tissue. Once the critical view of safety had been achieved, the cystic artery was doubly ligated between titanium hemoclips and then divided. The Swain clamp was applied to the infundibulum, and a cholangiocatheter was passed into the gallbladder at the junction with the cystic duct. Intraoperative cholangiogram was performed using half-strength Isovue.   Prompt filling of the intrahepatic and extrahepatic biliary system was noted, with passage of contrast rapidly into the duodenum. No filling defects were identified in the common bile duct, although filling defects in the pancreatic duct were present. The common bile duct was slightly dilated, and the pancreatic duct was likewise visualized in a dilated state. Following cholangiogram, the catheter and Swain clamp were removed. The cystic duct was triply ligated between titanium hemoclips and then divided. The gallbladder was dissected free from the gallbladder fossa of the liver using electrocautery and blunt dissection. Several posterior branches of the cystic artery were identified, controlled with clips before being divided. Once freed from the liver, the gallbladder was placed within a retrieval bag and removed from the patient's body. Once pneumoperitoneum had been reestablished, the right upper quadrant was irrigated with sterile saline. Small bleeding points within the hepatic parenchyma were controlled using electrocautery. The 12-mm fascial defect was closed with a series of 0 Vicryl sutures using a laparoscopic suture passer. Pneumoperitoneum was released, and all trocars were removed. All wounds were infiltrated with 0.5% Marcaine without epinephrine. All skin edges were reapproximated with a combination of subcuticular 4-0 Monocryl suture and Dermabond. The patient tolerated the procedure well. She was extubated in the operating room and transported to the recovery area in stable condition. The attending surgeon, Dr. Valentino Harvey, was scrubbed and present for the entire procedure.       Sheryl Aponte MD      [id number]  D:  12/04/2019 18:09  T:  12/05/2019 3:21  JOB #:  [id number]

## 2019-12-05 NOTE — PROGRESS NOTES
Progress Note    Patient: Pranav Emery MRN: [id number]  SSN: [id number]    YOB: 1960  Age: 63 y.o. Sex: female      Admit Date: 12/3/2019    1 Day Post-Op    Procedure:  Procedure(s):  CHOLECYSTECTOMY LAPAROSCOPIC WITH IOC    Subjective:     Patient complains of epigastric pain; Percocet ineffective; tolerating liquids. Objective:     Visit Vitals  /63 (BP 1 Location: Right arm, BP Patient Position: At rest)   Pulse 86   Temp 98.6 °F (37 °C)   Resp 18   Ht 5' 5\" (1.651 m)   Wt 154 lb 5.2 oz (70 kg)   SpO2 92%   Breastfeeding No   BMI 25.68 kg/m²       Temp (24hrs), Av °F (36.7 °C), Min:97.5 °F (36.4 °C), Max:98.6 °F (37 °C)      Physical Exam:    ABDOMEN: Non-distended, soft. Wounds dry and intact. Epigastric pain with palpation. Data Review: VS, I/O's, Labs    Lab Review:   Recent Results (from the past 12 hour(s))   CBC WITH AUTOMATED DIFF    Collection Time: 19  3:41 AM   Result Value Ref Range    WBC 7.3 3.6 - 11.0 K/uL    RBC 3.81 3.80 - 5.20 M/uL    HGB 11.2 (L) 11.5 - 16.0 g/dL    HCT 34.9 (L) 35.0 - 47.0 %    MCV 91.6 80.0 - 99.0 FL    MCH 29.4 26.0 - 34.0 PG    MCHC 32.1 30.0 - 36.5 g/dL    RDW 12.2 11.5 - 14.5 %    PLATELET 198 040 - 997 K/uL    MPV 8.5 (L) 8.9 - 12.9 FL    NRBC 0.0 0  WBC    ABSOLUTE NRBC 0.00 0.00 - 0.01 K/uL    NEUTROPHILS 84 (H) 32 - 75 %    LYMPHOCYTES 10 (L) 12 - 49 %    MONOCYTES 6 5 - 13 %    EOSINOPHILS 0 0 - 7 %    BASOPHILS 0 0 - 1 %    IMMATURE GRANULOCYTES 0 0.0 - 0.5 %    ABS. NEUTROPHILS 6.2 1.8 - 8.0 K/UL    ABS. LYMPHOCYTES 0.7 (L) 0.8 - 3.5 K/UL    ABS. MONOCYTES 0.4 0.0 - 1.0 K/UL    ABS. EOSINOPHILS 0.0 0.0 - 0.4 K/UL    ABS. BASOPHILS 0.0 0.0 - 0.1 K/UL    ABS. IMM.  GRANS. 0.0 0.00 - 0.04 K/UL    DF SMEAR SCANNED      RBC COMMENTS NORMOCYTIC, NORMOCHROMIC     METABOLIC PANEL, COMPREHENSIVE    Collection Time: 19  3:41 AM   Result Value Ref Range    Sodium 137 136 - 145 mmol/L    Potassium 4.0 3.5 - 5.1 mmol/L Chloride 103 97 - 108 mmol/L    CO2 28 21 - 32 mmol/L    Anion gap 6 5 - 15 mmol/L    Glucose 121 (H) 65 - 100 mg/dL    BUN 11 6 - 20 MG/DL    Creatinine 0.85 0.55 - 1.02 MG/DL    BUN/Creatinine ratio 13 12 - 20      GFR est AA >60 >60 ml/min/1.73m2    GFR est non-AA >60 >60 ml/min/1.73m2    Calcium 8.6 8.5 - 10.1 MG/DL    Bilirubin, total 0.7 0.2 - 1.0 MG/DL    ALT (SGPT) 316 (H) 12 - 78 U/L    AST (SGOT) 223 (H) 15 - 37 U/L    Alk. phosphatase 229 (H) 45 - 117 U/L    Protein, total 6.0 (L) 6.4 - 8.2 g/dL    Albumin 2.9 (L) 3.5 - 5.0 g/dL    Globulin 3.1 2.0 - 4.0 g/dL    A-G Ratio 0.9 (L) 1.1 - 2.2     LIPASE    Collection Time: 19  3:41 AM   Result Value Ref Range    Lipase >3,000 (H) 73 - 393 U/L         Assessment:     Hospital Problems  Date Reviewed: 2019          Codes Class Noted POA    Abdominal pain ICD-10-CM: R10.9  ICD-9-CM: 789.00  12/3/2019 Yes        * (Principal) Biliary acute pancreatitis ICD-10-CM: K85.10  ICD-9-CM: 496.5  12/3/2019 Yes            Post-cholangiogram pancreatitis. Other LFT's improving. Plan/Recommendations/Medical Decision Makin. Regress diet to clear liquids. 2. Change analgesic to Dilaudid. 3. Ambulation, spirometry.   4. Labs in AM.

## 2019-12-05 NOTE — PROGRESS NOTES
Problem: Falls - Risk of  Goal: *Absence of Falls  Description  Document Beti Chignik Fall Risk and appropriate interventions in the flowsheet. Outcome: Progressing Towards Goal  Note: Fall Risk Interventions:            Medication Interventions: Patient to call before getting OOB                   Problem: Patient Education: Go to Patient Education Activity  Goal: Patient/Family Education  Outcome: Progressing Towards Goal     Problem: Pain  Goal: *Control of Pain  Outcome: Progressing Towards Goal     Problem: Patient Education: Go to Patient Education Activity  Goal: Patient/Family Education  Outcome: Progressing Towards Goal     Problem: Pressure Injury - Risk of  Goal: *Prevention of pressure injury  Description  Document Vipin Scale and appropriate interventions in the flowsheet. Outcome: Progressing Towards Goal  Note: Pressure Injury Interventions:             Activity Interventions: Increase time out of bed         Nutrition Interventions: Document food/fluid/supplement intake

## 2019-12-06 VITALS
RESPIRATION RATE: 18 BRPM | WEIGHT: 154.32 LBS | DIASTOLIC BLOOD PRESSURE: 74 MMHG | HEART RATE: 95 BPM | SYSTOLIC BLOOD PRESSURE: 112 MMHG | TEMPERATURE: 98 F | BODY MASS INDEX: 25.71 KG/M2 | HEIGHT: 65 IN | OXYGEN SATURATION: 97 %

## 2019-12-06 LAB
ALBUMIN SERPL-MCNC: 2.9 G/DL (ref 3.5–5)
ALBUMIN/GLOB SERPL: 0.8 {RATIO} (ref 1.1–2.2)
ALP SERPL-CCNC: 200 U/L (ref 45–117)
ALT SERPL-CCNC: 210 U/L (ref 12–78)
ANION GAP SERPL CALC-SCNC: 3 MMOL/L (ref 5–15)
AST SERPL-CCNC: 104 U/L (ref 15–37)
BASOPHILS # BLD: 0 K/UL (ref 0–0.1)
BASOPHILS NFR BLD: 0 % (ref 0–1)
BILIRUB SERPL-MCNC: 0.5 MG/DL (ref 0.2–1)
BUN SERPL-MCNC: 7 MG/DL (ref 6–20)
BUN/CREAT SERPL: 9 (ref 12–20)
CALCIUM SERPL-MCNC: 8.3 MG/DL (ref 8.5–10.1)
CHLORIDE SERPL-SCNC: 103 MMOL/L (ref 97–108)
CO2 SERPL-SCNC: 31 MMOL/L (ref 21–32)
CREAT SERPL-MCNC: 0.8 MG/DL (ref 0.55–1.02)
DIFFERENTIAL METHOD BLD: ABNORMAL
EOSINOPHIL # BLD: 0 K/UL (ref 0–0.4)
EOSINOPHIL NFR BLD: 0 % (ref 0–7)
ERYTHROCYTE [DISTWIDTH] IN BLOOD BY AUTOMATED COUNT: 12.3 % (ref 11.5–14.5)
GLOBULIN SER CALC-MCNC: 3.5 G/DL (ref 2–4)
GLUCOSE SERPL-MCNC: 105 MG/DL (ref 65–100)
HCT VFR BLD AUTO: 34.1 % (ref 35–47)
HGB BLD-MCNC: 11.1 G/DL (ref 11.5–16)
IMM GRANULOCYTES # BLD AUTO: 0 K/UL (ref 0–0.04)
IMM GRANULOCYTES NFR BLD AUTO: 0 % (ref 0–0.5)
LIPASE SERPL-CCNC: >3000 U/L (ref 73–393)
LYMPHOCYTES # BLD: 1.3 K/UL (ref 0.8–3.5)
LYMPHOCYTES NFR BLD: 17 % (ref 12–49)
MCH RBC QN AUTO: 29.9 PG (ref 26–34)
MCHC RBC AUTO-ENTMCNC: 32.6 G/DL (ref 30–36.5)
MCV RBC AUTO: 91.9 FL (ref 80–99)
MONOCYTES # BLD: 0.4 K/UL (ref 0–1)
MONOCYTES NFR BLD: 6 % (ref 5–13)
NEUTS SEG # BLD: 5.7 K/UL (ref 1.8–8)
NEUTS SEG NFR BLD: 77 % (ref 32–75)
NRBC # BLD: 0 K/UL (ref 0–0.01)
NRBC BLD-RTO: 0 PER 100 WBC
PLATELET # BLD AUTO: 254 K/UL (ref 150–400)
PMV BLD AUTO: 8.7 FL (ref 8.9–12.9)
POTASSIUM SERPL-SCNC: 3.8 MMOL/L (ref 3.5–5.1)
PROT SERPL-MCNC: 6.4 G/DL (ref 6.4–8.2)
RBC # BLD AUTO: 3.71 M/UL (ref 3.8–5.2)
SODIUM SERPL-SCNC: 137 MMOL/L (ref 136–145)
WBC # BLD AUTO: 7.4 K/UL (ref 3.6–11)

## 2019-12-06 PROCEDURE — 36415 COLL VENOUS BLD VENIPUNCTURE: CPT

## 2019-12-06 PROCEDURE — 74011250637 HC RX REV CODE- 250/637: Performed by: SURGERY

## 2019-12-06 PROCEDURE — 83690 ASSAY OF LIPASE: CPT

## 2019-12-06 PROCEDURE — 85025 COMPLETE CBC W/AUTO DIFF WBC: CPT

## 2019-12-06 PROCEDURE — 80053 COMPREHEN METABOLIC PANEL: CPT

## 2019-12-06 PROCEDURE — 74011250636 HC RX REV CODE- 250/636: Performed by: SURGERY

## 2019-12-06 RX ORDER — HYDROMORPHONE HYDROCHLORIDE 2 MG/1
2 TABLET ORAL
Qty: 20 TAB | Refills: 0 | Status: SHIPPED | OUTPATIENT
Start: 2019-12-06 | End: 2019-12-11

## 2019-12-06 RX ORDER — ONDANSETRON 4 MG/1
4 TABLET, ORALLY DISINTEGRATING ORAL
Qty: 10 TAB | Refills: 0 | Status: SHIPPED | OUTPATIENT
Start: 2019-12-06 | End: 2022-09-29

## 2019-12-06 RX ORDER — LEVOFLOXACIN 500 MG/1
500 TABLET, FILM COATED ORAL DAILY
Qty: 5 TAB | Refills: 0 | Status: SHIPPED | OUTPATIENT
Start: 2019-12-06 | End: 2019-12-11

## 2019-12-06 RX ADMIN — CITALOPRAM HYDROBROMIDE 10 MG: 20 TABLET ORAL at 09:46

## 2019-12-06 RX ADMIN — SODIUM CHLORIDE, SODIUM LACTATE, POTASSIUM CHLORIDE, AND CALCIUM CHLORIDE 125 ML/HR: 600; 310; 30; 20 INJECTION, SOLUTION INTRAVENOUS at 00:53

## 2019-12-06 RX ADMIN — PANTOPRAZOLE SODIUM 40 MG: 40 TABLET, DELAYED RELEASE ORAL at 06:06

## 2019-12-06 RX ADMIN — SODIUM CHLORIDE, SODIUM LACTATE, POTASSIUM CHLORIDE, AND CALCIUM CHLORIDE 500 ML: 600; 310; 30; 20 INJECTION, SOLUTION INTRAVENOUS at 00:06

## 2019-12-06 RX ADMIN — LORAZEPAM 1 MG: 1 TABLET ORAL at 13:19

## 2019-12-06 RX ADMIN — ESTRADIOL 2 MG: 1 TABLET ORAL at 09:45

## 2019-12-06 RX ADMIN — ACETAMINOPHEN 650 MG: 325 TABLET ORAL at 04:21

## 2019-12-06 NOTE — PROGRESS NOTES
I have reviewed discharge instructions with the patient. The patient verbalized understanding.     Aida White RN

## 2019-12-06 NOTE — ROUTINE PROCESS
Bedside and Verbal shift change report given to oncoming nurse by Gemma Gimenez RN (offgoing nurse). Report given with SBAR, Kardex, MAR and Recent Results.

## 2019-12-06 NOTE — PROGRESS NOTES
Paged  on call regarding patients heart rate awaiting call back  11:22 PM  Spoke with , orders received for 500cc bolus of LR.

## 2019-12-06 NOTE — DISCHARGE INSTRUCTIONS
Patient Education   Call [phone] to schedule Surgery follow-up appointment for 2 weeks following hospital discharge. Cholecystectomy: What to Expect at [facility]  After your surgery, it is normal to feel weak and tired for several days after you return home. Your belly may be swollen. If you had laparoscopic surgery, you may also have pain in your shoulder for about 24 hours. You may have gas or need to burp a lot at first, and a few people get diarrhea. The diarrhea usually goes away in 2 to 4 weeks, but it may last longer. How quickly you recover depends on whether you had a laparoscopic or open surgery. · For a laparoscopic surgery, most people can go back to work or their normal routine in 1 to 2 weeks, but it may take longer, depending on the type of work you do. · For an open surgery, it will probably take 4 to 6 weeks before you get back to your normal routine. This care sheet gives you a general idea about how long it will take for you to recover. However, each person recovers at a different pace. Follow the steps below to get better as quickly as possible. How can you care for yourself at home? Activity    · Rest when you feel tired. Getting enough sleep will help you recover.     · Try to walk each day. Start out by walking a little more than you did the day before. Gradually increase the amount you walk. Walking boosts blood flow and helps prevent pneumonia and constipation.     · For about 2 to 4 weeks, avoid lifting anything that would make you strain. This may include a child, heavy grocery bags and milk containers, a heavy briefcase or backpack, cat litter or dog food bags, or a vacuum .     · Avoid strenuous activities, such as biking, jogging, weightlifting, and aerobic exercise, until your doctor says it is okay.     · You may shower 24 hours after surgery. Pat the cuts (incisions) dry. Do not take a bath for the first 2 weeks, or until your doctor tells you it is okay.   · You may drive when you are no longer taking pain medicine and can quickly move your foot from the gas pedal to the brake. You must also be able to sit comfortably for a long period of time, even if you do not plan to go far. You might get caught in traffic.     · For a laparoscopic surgery, most people can go back to work or their normal routine in 1 to 2 weeks, but it may take longer. For an open surgery, it will probably take 4 to 6 weeks before you get back to your normal routine.     · Your doctor will tell you when you can have sex again.    Diet: CLEAR LIQUIDS Friday-Sunday; START LOW FAT SOLIDS ON Monday!    · Eat smaller meals more often instead of fewer larger meals. You can eat a normal diet, but avoid eating fatty foods for about 1 month. Fatty foods include hamburger, whole milk, cheese, and many snack foods. If your stomach is upset, try bland, low-fat foods like plain rice, broiled chicken, toast, and yogurt.     · Drink plenty of fluids (unless your doctor tells you not to).   · If you have diarrhea, try avoiding spicy foods, dairy products, fatty foods, and alcohol. You can also watch to see if specific foods cause it, and stop eating them. If the diarrhea continues for more than 2 weeks, talk to your doctor.     · You may notice that your bowel movements are not regular right after your surgery. This is common. Try to avoid constipation and straining with bowel movements. You may want to take a fiber supplement every day. If you have not had a bowel movement after a couple of days, ask your doctor about taking a mild laxative. Medicines    · Your doctor will tell you if and when you can restart your medicines. He or she will also give you instructions about taking any new medicines.     · If you take blood thinners, such as warfarin (Coumadin), clopidogrel (Plavix), or aspirin, be sure to talk to your doctor. He or she will tell you if and when to start taking those medicines again.  Make sure that you understand exactly what your doctor wants you to do.     · Take pain medicines exactly as directed. ? If the doctor gave you a prescription medicine for pain, take it as prescribed. ? If you are not taking a prescription pain medicine, take an over-the-counter medicine such as acetaminophen (Tylenol), ibuprofen (Advil, Motrin), or naproxen (Aleve). Read and follow all instructions on the label. ? Do not take two or more pain medicines at the same time unless the doctor told you to. Many pain medicines contain acetaminophen, which is Tylenol. Too much Tylenol can be harmful.     · If you think your pain medicine is making you sick to your stomach:  ? Take your medicine after meals (unless your doctor tells you not to). ? Ask your doctor for a different pain medicine.     · If your doctor prescribed antibiotics, take them as directed. Do not stop taking them just because you feel better. You need to take the full course of antibiotics. Incision care    · If you have strips of tape on the incision, or cut, leave the tape on for a week or until it falls off.     · After 24 to 48 hours, wash the area daily with warm, soapy water, and pat it dry.     · You may have staples to hold the cut together. Keep them dry until your doctor takes them out. This is usually in 7 to 10 days.     · Keep the area clean and dry. You may cover it with a gauze bandage if it weeps or rubs against clothing. Change the bandage every day.    Ice    · To reduce swelling and pain, put ice or a cold pack on your belly for 10 to 20 minutes at a time. Do this every 1 to 2 hours. Put a thin cloth between the ice and your skin. Follow-up care is a key part of your treatment and safety. Be sure to make and go to all appointments, and call your doctor if you are having problems. It's also a good idea to know your test results and keep a list of the medicines you take. When should you call for help?   Call 911 anytime you think you may need emergency care. For example, call if:    · You passed out (lost consciousness).     · You are short of breath. Madisyn Prime your doctor now or seek immediate medical care if:    · You are sick to your stomach and cannot drink fluids.     · You have pain that does not get better when you take your pain medicine.     · You cannot pass stools or gas.     · You have signs of infection, such as:  ? Increased pain, swelling, warmth, or redness. ? Red streaks leading from the incision. ? Pus draining from the incision. ? A fever.     · Bright red blood has soaked through the bandage over your incision.     · You have loose stitches, or your incision comes open.     · You have signs of a blood clot in your leg (called a deep vein thrombosis), such as:  ? Pain in your calf, back of knee, thigh, or groin. ? Redness and swelling in your leg or groin.    Watch closely for any changes in your health, and be sure to contact your doctor if you have any problems. Where can you learn more? Go to http://bradley-michelle.info/. Enter 677 46 [id number] in the search box to learn more about \"Cholecystectomy: What to Expect at Home. \"  Current as of: November 7, 2018  Content Version: 12.2  © 4604-1198 [employer]. Care instructions adapted under license by [employer] (which disclaims liability or warranty for this information). If you have questions about a medical condition or this instruction, always ask your healthcare professional. [place] [address] any warranty or liability for your use of this information.

## 2019-12-06 NOTE — PROGRESS NOTES
Progress Note    Patient: Jodi Castro MRN: [id number]  SSN: [id number]    YOB: 1960  Age: 63 y.o. Sex: female      Admit Date: 12/3/2019    1 Days Post-Op    Procedure:  Procedure(s):  CHOLECYSTECTOMY LAPAROSCOPIC WITH IOC    Subjective:     Patient notes improvement in epigastric pain; tolerating clear liquids; no fever or chills. Objective:     Visit Vitals  /74 (BP 1 Location: Right arm, BP Patient Position: Sitting)   Pulse 95   Temp 98 °F (36.7 °C)   Resp 18   Ht 5' 5\" (1.651 m)   Wt 154 lb 5.2 oz (70 kg)   SpO2 97%   Breastfeeding No   BMI 25.68 kg/m²       Temp (24hrs), Av.6 °F (37 °C), Min:98 °F (36.7 °C), Max:99.6 °F (37.6 °C)      Physical Exam:    ABDOMEN: Non-distended, soft. Wounds dry and intact. Epigastric pain with palpation. Data Review: VS, I/O's, Labs    Lab Review:   Recent Results (from the past 12 hour(s))   CBC WITH AUTOMATED DIFF    Collection Time: 19  4:17 AM   Result Value Ref Range    WBC 7.4 3.6 - 11.0 K/uL    RBC 3.71 (L) 3.80 - 5.20 M/uL    HGB 11.1 (L) 11.5 - 16.0 g/dL    HCT 34.1 (L) 35.0 - 47.0 %    MCV 91.9 80.0 - 99.0 FL    MCH 29.9 26.0 - 34.0 PG    MCHC 32.6 30.0 - 36.5 g/dL    RDW 12.3 11.5 - 14.5 %    PLATELET 925 793 - 150 K/uL    MPV 8.7 (L) 8.9 - 12.9 FL    NRBC 0.0 0  WBC    ABSOLUTE NRBC 0.00 0.00 - 0.01 K/uL    NEUTROPHILS 77 (H) 32 - 75 %    LYMPHOCYTES 17 12 - 49 %    MONOCYTES 6 5 - 13 %    EOSINOPHILS 0 0 - 7 %    BASOPHILS 0 0 - 1 %    IMMATURE GRANULOCYTES 0 0.0 - 0.5 %    ABS. NEUTROPHILS 5.7 1.8 - 8.0 K/UL    ABS. LYMPHOCYTES 1.3 0.8 - 3.5 K/UL    ABS. MONOCYTES 0.4 0.0 - 1.0 K/UL    ABS. EOSINOPHILS 0.0 0.0 - 0.4 K/UL    ABS. BASOPHILS 0.0 0.0 - 0.1 K/UL    ABS. IMM.  GRANS. 0.0 0.00 - 0.04 K/UL    DF AUTOMATED     METABOLIC PANEL, COMPREHENSIVE    Collection Time: 19  4:17 AM   Result Value Ref Range    Sodium 137 136 - 145 mmol/L    Potassium 3.8 3.5 - 5.1 mmol/L    Chloride 103 97 - 108 mmol/L    CO2 31 21 - 32 mmol/L    Anion gap 3 (L) 5 - 15 mmol/L    Glucose 105 (H) 65 - 100 mg/dL    BUN 7 6 - 20 MG/DL    Creatinine 0.80 0.55 - 1.02 MG/DL    BUN/Creatinine ratio 9 (L) 12 - 20      GFR est AA >60 >60 ml/min/1.73m2    GFR est non-AA >60 >60 ml/min/1.73m2    Calcium 8.3 (L) 8.5 - 10.1 MG/DL    Bilirubin, total 0.5 0.2 - 1.0 MG/DL    ALT (SGPT) 210 (H) 12 - 78 U/L    AST (SGOT) 104 (H) 15 - 37 U/L    Alk. phosphatase 200 (H) 45 - 117 U/L    Protein, total 6.4 6.4 - 8.2 g/dL    Albumin 2.9 (L) 3.5 - 5.0 g/dL    Globulin 3.5 2.0 - 4.0 g/dL    A-G Ratio 0.8 (L) 1.1 - 2.2     LIPASE    Collection Time: 19  4:17 AM   Result Value Ref Range    Lipase >3,000 (H) 73 - 393 U/L         Assessment:     Hospital Problems  Date Reviewed: 2019          Codes Class Noted POA    Abdominal pain ICD-10-CM: R10.9  ICD-9-CM: 789.00  12/3/2019 Yes        * (Principal) Biliary acute pancreatitis ICD-10-CM: K85.10  ICD-9-CM: 352.8  12/3/2019 Yes            Post-cholangiogram pancreatitis. Other LFT's improving. Symptomatically improved, desires to go home. Plan/Recommendations/Medical Decision Makin. Discharge to home on clear liquids.

## 2019-12-10 ENCOUNTER — TELEPHONE (OUTPATIENT)
Dept: SURGERY | Age: 59
End: 2019-12-10

## 2019-12-10 NOTE — TELEPHONE ENCOUNTER
I called the patient and she asked me if she needs to l\take her last antibiotic, she siad she has been taking it at night and she has not been sleeping good. I told her she could take it now. She then said she has been shaking and sweating. after we talked it sounded like low blood sugar. She is eating now and eating chicken. I told her to work on her protein and if this does not help to call me back. Pt in agreement.

## 2019-12-10 NOTE — TELEPHONE ENCOUNTER
Patient called stating she hasn't been able to sleep at night and she's experiencing anxiety. Patient stated she would like to ask if the nurse if she needs to take the last dose of the the levofloxacin medication.

## 2019-12-11 NOTE — DISCHARGE SUMMARY
Physician Discharge Summary     Patient ID:  Nicolas Dennis  [id number]  63 y.o.  1960    Allergies: Patient has no known allergies. Admit Date: 12/3/2019    Discharge Date: 12/6/2019    * Admission Diagnoses: Abdominal pain [R10.9]; Biliary acute pancreatitis [K85.10]    * Discharge Diagnoses:    Hospital Problems as of 12/6/2019 Date Reviewed: 12/5/2019          Codes Class Noted - Resolved POA    Abdominal pain ICD-10-CM: R10.9  ICD-9-CM: 789.00  12/3/2019 - Present Yes        * (Principal) Biliary acute pancreatitis ICD-10-CM: K85.10  ICD-9-CM: 231.6  12/3/2019 - Present Yes               Admission Condition: Fair    * Discharge Condition: improved    * Procedures: Procedure(s):  CHOLECYSTECTOMY LAPAROSCOPIC With grams, possible open    [facility] Course:   She was admitted and resuscitated; pain and nausea controlled. She underwent laparoscopic cholecystectomy with IOC on 12/4; post-op she experienced recurrent pancreatitis which clinically improved with clear liquid diet. Consults: None    Significant Diagnostic Studies: radiology: MRI: no CBD stone    * Disposition: Home    Discharge Medications:   Discharge Medication List as of 12/6/2019  3:02 PM      START taking these medications    Details   HYDROmorphone (DILAUDID) 2 mg tablet Take 1 Tab by mouth every six (6) hours as needed for Pain for up to 5 days. Max Daily Amount: 8 mg., Print, Disp-20 Tab, R-0      ondansetron (ZOFRAN ODT) 4 mg disintegrating tablet Take 1 Tab by mouth every eight (8) hours as needed for Nausea. , Print, Disp-10 Tab, R-0      levoFLOXacin (LEVAQUIN) 500 mg tablet Take 1 Tab by mouth daily for 5 days. , Print, Disp-5 Tab, R-0         CONTINUE these medications which have NOT CHANGED    Details   estradiol (VAGIFEM) 10 mcg tab vaginal tablet Insert 10 mcg into vagina two (2) times a week., Historical Med      conjugated estrogens (PREMARIN) 0.625 mg/gram vaginal cream Apply 0.5 g to affected area daily. , Disp-30 g, R-2, Normal      estradiol (ESTRACE) 2 mg tablet Take  by mouth daily. , Historical Med      citalopram (CELEXA) 10 mg tablet Take  by mouth daily. , Historical Med      esomeprazole (NEXIUM) 20 mg capsule Take  by mouth daily. , Historical Med             * Follow-up Care/Patient Instructions:   Activity: No heavy lifting, pushing, pulling x 2 weeks   Diet: clear liquids until 12/9, then advance as tolerated  Wound Care: Keep wounds clean and dry    Follow-up Information     Follow up With Specialties Details Why Contact Info    None    None ([phone]) Patient stated that they have no PCP      Mario Lawrence NP Nurse Practitioner   [phone]          Wound check in 2 weeks    Signed:  Brielle Ramos MD  12/11/2019  1:54 PM

## 2019-12-18 ENCOUNTER — TELEPHONE (OUTPATIENT)
Dept: SURGERY | Age: 59
End: 2019-12-18

## 2019-12-18 ENCOUNTER — OFFICE VISIT (OUTPATIENT)
Dept: SURGERY | Age: 59
End: 2019-12-18

## 2019-12-18 VITALS
HEIGHT: 65 IN | WEIGHT: 147.8 LBS | HEART RATE: 111 BPM | OXYGEN SATURATION: 97 % | RESPIRATION RATE: 19 BRPM | BODY MASS INDEX: 24.62 KG/M2 | TEMPERATURE: 97.6 F | SYSTOLIC BLOOD PRESSURE: 126 MMHG | DIASTOLIC BLOOD PRESSURE: 70 MMHG

## 2019-12-18 DIAGNOSIS — Z09 POSTOPERATIVE EXAMINATION: Primary | ICD-10-CM

## 2019-12-18 DIAGNOSIS — R42 LIGHTHEADEDNESS: ICD-10-CM

## 2019-12-18 NOTE — PROGRESS NOTES
1. Have you been to the ER, urgent care clinic since your last visit? Hospitalized since your last visit? No    2. Have you seen or consulted any other health care providers outside of the [address] [place] [address] since your last visit? Include any pap smears or colon screening.  No

## 2019-12-18 NOTE — PROGRESS NOTES
Subjective: Khurram Gordillo is a 63 y.o. female presents for postop care  2 weeks status post Lap cholecystectomy  Appetite is fair. Eating a regular diet without difficulty. Bowel movements are regular. She complains of lightheaded and dizziness. She has been eating low fat food. +BM. Denies abdominal pain. Patient has an advanced directive: NO      Ms. Honorio Kaur has a reminder for a \"due or due soon\" health maintenance. I have asked that she contact her primary care provider for follow-up on this health maintenance. Objective:     Visit Vitals  /70 (BP 1 Location: Left arm, BP Patient Position: Sitting)   Pulse (!) 111   Temp 97.6 °F (36.4 °C) (Oral)   Resp 19   Ht 5' 5\" (1.651 m)   Wt 147 lb 12.8 oz (67 kg)   SpO2 97%   BMI 24.60 kg/m²       General:  alert, cooperative, no distress   Abdomen: soft, bowel sounds active, non-tender   Incision:   healing well, no drainage, no erythema, no seroma, no swelling, no dehiscence, incision well approximated     Assessment:     Doing well postoperatively. Lightheadeness. Plan:     1. Add more salt and may introduce fat in her diet. 2. Add Gatorade, propel, etc.   3. If it continues, follow up with PCP. Pt verbalized understanding and questions were answered to the best of my knowledge and ability.

## 2019-12-18 NOTE — PATIENT INSTRUCTIONS
Cholecystectomy: What to Expect at [address] [place] [address]  After your surgery, it is normal to feel weak and tired for several days after you return home. Your belly may be swollen. If you had laparoscopic surgery, you may also have pain in your shoulder for about 24 hours. You may have gas or need to burp a lot at first, and a few people get diarrhea. The diarrhea usually goes away in 2 to 4 weeks, but it may last longer. How quickly you recover depends on whether you had a laparoscopic or open surgery. · For a laparoscopic surgery, most people can go back to work or their normal routine in 1 to 2 weeks, but it may take longer, depending on the type of work you do. · For an open surgery, it will probably take 4 to 6 weeks before you get back to your normal routine. This care sheet gives you a general idea about how long it will take for you to recover. However, each person recovers at a different pace. Follow the steps below to get better as quickly as possible. How can you care for yourself at home? Activity    · Rest when you feel tired. Getting enough sleep will help you recover.     · Try to walk each day. Start out by walking a little more than you did the day before. Gradually increase the amount you walk. Walking boosts blood flow and helps prevent pneumonia and constipation.     · For about 2 to 4 weeks, avoid lifting anything that would make you strain. This may include a child, heavy grocery bags and milk containers, a heavy briefcase or backpack, cat litter or dog food bags, or a vacuum .     · Avoid strenuous activities, such as biking, jogging, weightlifting, and aerobic exercise, until your doctor says it is okay.     · You may shower 24 to 48 hours after surgery, if your doctor okays it. Pat the cut (incision) dry.  Do not take a bath for the first 2 weeks, or until your doctor tells you it is okay.     · You may drive when you are no longer taking pain medicine and can quickly move your foot from the gas pedal to the brake. You must also be able to sit comfortably for a long period of time, even if you do not plan to go far. You might get caught in traffic.     · For a laparoscopic surgery, most people can go back to work or their normal routine in 1 to 2 weeks, but it may take longer. For an open surgery, it will probably take 4 to 6 weeks before you get back to your normal routine.     · Your doctor will tell you when you can have sex again.    Diet    · Eat smaller meals more often instead of fewer larger meals. You can eat a normal diet, but avoid eating fatty foods for about 1 month. Fatty foods include hamburger, whole milk, cheese, and many snack foods. If your stomach is upset, try bland, low-fat foods like plain rice, broiled chicken, toast, and yogurt.     · Drink plenty of fluids (unless your doctor tells you not to).   · If you have diarrhea, try avoiding spicy foods, dairy products, fatty foods, and alcohol. You can also watch to see if specific foods cause it, and stop eating them. If the diarrhea continues for more than 2 weeks, talk to your doctor.     · You may notice that your bowel movements are not regular right after your surgery. This is common. Try to avoid constipation and straining with bowel movements. You may want to take a fiber supplement every day. If you have not had a bowel movement after a couple of days, ask your doctor about taking a mild laxative. Medicines    · Your doctor will tell you if and when you can restart your medicines. He or she will also give you instructions about taking any new medicines.     · If you take blood thinners, such as warfarin (Coumadin), clopidogrel (Plavix), or aspirin, be sure to talk to your doctor. He or she will tell you if and when to start taking those medicines again. Make sure that you understand exactly what your doctor wants you to do.     · Take pain medicines exactly as directed.   ? If the doctor gave you a prescription medicine for pain, take it as prescribed. ? If you are not taking a prescription pain medicine, take an over-the-counter medicine such as acetaminophen (Tylenol), ibuprofen (Advil, Motrin), or naproxen (Aleve). Read and follow all instructions on the label. ? Do not take two or more pain medicines at the same time unless the doctor told you to. Many pain medicines contain acetaminophen, which is Tylenol. Too much Tylenol can be harmful.     · If you think your pain medicine is making you sick to your stomach:  ? Take your medicine after meals (unless your doctor tells you not to). ? Ask your doctor for a different pain medicine.     · If your doctor prescribed antibiotics, take them as directed. Do not stop taking them just because you feel better. You need to take the full course of antibiotics. Incision care    · If you have strips of tape on the incision, or cut, leave the tape on for a week or until it falls off.     · After 24 to 48 hours, wash the area daily with warm, soapy water, and pat it dry.     · You may have staples to hold the cut together. Keep them dry until your doctor takes them out. This is usually in 7 to 10 days.     · Keep the area clean and dry. You may cover it with a gauze bandage if it weeps or rubs against clothing. Change the bandage every day.    Ice    · To reduce swelling and pain, put ice or a cold pack on your belly for 10 to 20 minutes at a time. Do this every 1 to 2 hours. Put a thin cloth between the ice and your skin. Follow-up care is a key part of your treatment and safety. Be sure to make and go to all appointments, and call your doctor if you are having problems. It's also a good idea to know your test results and keep a list of the medicines you take. When should you call for help? Call 911 anytime you think you may need emergency care. For example, call if:    · You passed out (lost consciousness).     · You are short of breath. Msohe Diasing your doctor now or seek immediate medical care if:    · You are sick to your stomach and cannot drink fluids.     · You have pain that does not get better when you take your pain medicine.     · You cannot pass stools or gas.     · You have signs of infection, such as:  ? Increased pain, swelling, warmth, or redness. ? Red streaks leading from the incision. ? Pus draining from the incision. ? A fever.     · Bright red blood has soaked through the bandage over your incision.     · You have loose stitches, or your incision comes open.     · You have signs of a blood clot in your leg (called a deep vein thrombosis), such as:  ? Pain in your calf, back of knee, thigh, or groin. ? Redness and swelling in your leg or groin.    Watch closely for any changes in your health, and be sure to contact your doctor if you have any problems. Where can you learn more? Go to http://bradley-michelle.info/. Enter [phone] in the search box to learn more about \"Cholecystectomy: What to Expect at Home. \"  Current as of: November 7, 2018  Content Version: 12.2  © 0432-4068 [employer]. Care instructions adapted under license by [employer] (which disclaims liability or warranty for this information). If you have questions about a medical condition or this instruction, always ask your healthcare professional. [address] any warranty or liability for your use of this information.

## 2019-12-24 ENCOUNTER — APPOINTMENT (OUTPATIENT)
Dept: GENERAL RADIOLOGY | Age: 59
End: 2019-12-24
Attending: EMERGENCY MEDICINE
Payer: COMMERCIAL

## 2019-12-24 ENCOUNTER — HOSPITAL ENCOUNTER (EMERGENCY)
Age: 59
Discharge: HOME | End: 2019-12-24
Attending: EMERGENCY MEDICINE
Payer: COMMERCIAL

## 2019-12-24 VITALS
HEART RATE: 84 BPM | OXYGEN SATURATION: 100 % | RESPIRATION RATE: 16 BRPM | TEMPERATURE: 98.1 F | BODY MASS INDEX: 24.79 KG/M2 | DIASTOLIC BLOOD PRESSURE: 62 MMHG | WEIGHT: 148.81 LBS | HEIGHT: 65 IN | SYSTOLIC BLOOD PRESSURE: 103 MMHG

## 2019-12-24 DIAGNOSIS — R42 LIGHTHEADEDNESS: Primary | ICD-10-CM

## 2019-12-24 LAB
ALBUMIN SERPL-MCNC: 3.8 G/DL (ref 3.5–5)
ALBUMIN/GLOB SERPL: 1 {RATIO} (ref 1.1–2.2)
ALP SERPL-CCNC: 106 U/L (ref 45–117)
ALT SERPL-CCNC: 38 U/L (ref 12–78)
ANION GAP SERPL CALC-SCNC: 7 MMOL/L (ref 5–15)
APPEARANCE UR: CLEAR
AST SERPL-CCNC: 26 U/L (ref 15–37)
BACTERIA URNS QL MICRO: NEGATIVE /HPF
BASOPHILS # BLD: 0 K/UL (ref 0–0.1)
BASOPHILS NFR BLD: 1 % (ref 0–1)
BILIRUB SERPL-MCNC: 0.2 MG/DL (ref 0.2–1)
BILIRUB UR QL: NEGATIVE
BUN SERPL-MCNC: 21 MG/DL (ref 6–20)
BUN/CREAT SERPL: 20 (ref 12–20)
CALCIUM SERPL-MCNC: 9 MG/DL (ref 8.5–10.1)
CHLORIDE SERPL-SCNC: 102 MMOL/L (ref 97–108)
CO2 SERPL-SCNC: 31 MMOL/L (ref 21–32)
COLOR UR: NORMAL
COMMENT, HOLDF: NORMAL
CREAT SERPL-MCNC: 1.03 MG/DL (ref 0.55–1.02)
DIFFERENTIAL METHOD BLD: NORMAL
EOSINOPHIL # BLD: 0.1 K/UL (ref 0–0.4)
EOSINOPHIL NFR BLD: 2 % (ref 0–7)
EPITH CASTS URNS QL MICRO: NORMAL /LPF
ERYTHROCYTE [DISTWIDTH] IN BLOOD BY AUTOMATED COUNT: 12.2 % (ref 11.5–14.5)
GLOBULIN SER CALC-MCNC: 3.9 G/DL (ref 2–4)
GLUCOSE SERPL-MCNC: 94 MG/DL (ref 65–100)
GLUCOSE UR STRIP.AUTO-MCNC: NEGATIVE MG/DL
HCT VFR BLD AUTO: 38.3 % (ref 35–47)
HGB BLD-MCNC: 12.8 G/DL (ref 11.5–16)
HGB UR QL STRIP: NEGATIVE
IMM GRANULOCYTES # BLD AUTO: 0 K/UL (ref 0–0.04)
IMM GRANULOCYTES NFR BLD AUTO: 0 % (ref 0–0.5)
KETONES UR QL STRIP.AUTO: NEGATIVE MG/DL
LEUKOCYTE ESTERASE UR QL STRIP.AUTO: NEGATIVE
LIPASE SERPL-CCNC: 551 U/L (ref 73–393)
LYMPHOCYTES # BLD: 1.2 K/UL (ref 0.8–3.5)
LYMPHOCYTES NFR BLD: 25 % (ref 12–49)
MCH RBC QN AUTO: 29.2 PG (ref 26–34)
MCHC RBC AUTO-ENTMCNC: 33.4 G/DL (ref 30–36.5)
MCV RBC AUTO: 87.4 FL (ref 80–99)
MONOCYTES # BLD: 0.4 K/UL (ref 0–1)
MONOCYTES NFR BLD: 8 % (ref 5–13)
NEUTS SEG # BLD: 3.1 K/UL (ref 1.8–8)
NEUTS SEG NFR BLD: 65 % (ref 32–75)
NITRITE UR QL STRIP.AUTO: NEGATIVE
NRBC # BLD: 0 K/UL (ref 0–0.01)
NRBC BLD-RTO: 0 PER 100 WBC
PH UR STRIP: 6.5 [PH] (ref 5–8)
PLATELET # BLD AUTO: 361 K/UL (ref 150–400)
PMV BLD AUTO: 9.2 FL (ref 8.9–12.9)
POTASSIUM SERPL-SCNC: 3.3 MMOL/L (ref 3.5–5.1)
PROT SERPL-MCNC: 7.7 G/DL (ref 6.4–8.2)
PROT UR STRIP-MCNC: NEGATIVE MG/DL
RBC # BLD AUTO: 4.38 M/UL (ref 3.8–5.2)
RBC #/AREA URNS HPF: NORMAL /HPF (ref 0–5)
SAMPLES BEING HELD,HOLD: NORMAL
SODIUM SERPL-SCNC: 140 MMOL/L (ref 136–145)
SP GR UR REFRACTOMETRY: 1.01 (ref 1–1.03)
TROPONIN I SERPL-MCNC: <0.05 NG/ML
UROBILINOGEN UR QL STRIP.AUTO: 0.2 EU/DL (ref 0.2–1)
WBC # BLD AUTO: 4.7 K/UL (ref 3.6–11)
WBC URNS QL MICRO: NORMAL /HPF (ref 0–4)

## 2019-12-24 PROCEDURE — 80053 COMPREHEN METABOLIC PANEL: CPT

## 2019-12-24 PROCEDURE — 74011250636 HC RX REV CODE- 250/636: Performed by: EMERGENCY MEDICINE

## 2019-12-24 PROCEDURE — 71046 X-RAY EXAM CHEST 2 VIEWS: CPT

## 2019-12-24 PROCEDURE — 83690 ASSAY OF LIPASE: CPT

## 2019-12-24 PROCEDURE — 74011250637 HC RX REV CODE- 250/637: Performed by: EMERGENCY MEDICINE

## 2019-12-24 PROCEDURE — 93005 ELECTROCARDIOGRAM TRACING: CPT

## 2019-12-24 PROCEDURE — 36415 COLL VENOUS BLD VENIPUNCTURE: CPT

## 2019-12-24 PROCEDURE — 81001 URINALYSIS AUTO W/SCOPE: CPT

## 2019-12-24 PROCEDURE — 85025 COMPLETE CBC W/AUTO DIFF WBC: CPT

## 2019-12-24 PROCEDURE — 84484 ASSAY OF TROPONIN QUANT: CPT

## 2019-12-24 PROCEDURE — 99284 EMERGENCY DEPT VISIT MOD MDM: CPT

## 2019-12-24 RX ORDER — LORAZEPAM 1 MG/1
1 TABLET ORAL
Status: COMPLETED | OUTPATIENT
Start: 2019-12-24 | End: 2019-12-24

## 2019-12-24 RX ORDER — POTASSIUM CHLORIDE 750 MG/1
40 TABLET, FILM COATED, EXTENDED RELEASE ORAL
Status: COMPLETED | OUTPATIENT
Start: 2019-12-24 | End: 2019-12-24

## 2019-12-24 RX ADMIN — SODIUM CHLORIDE 1000 ML: 900 INJECTION, SOLUTION INTRAVENOUS at 15:54

## 2019-12-24 RX ADMIN — LORAZEPAM 1 MG: 1 TABLET ORAL at 15:52

## 2019-12-24 RX ADMIN — POTASSIUM CHLORIDE 40 MEQ: 750 TABLET, FILM COATED, EXTENDED RELEASE ORAL at 15:52

## 2019-12-24 NOTE — ED PROVIDER NOTES
Is a 31-year-old female who is 2 weeks postop from cholecystectomy and pancreatitis (per Dr Jatin Juarez) who presents with lightheadedness since procedure. Reports postop check, where there was no concern for postop infection. Denies any abdominal pain, nausea, vomiting. Reports she wakes up every night covered in sweat, anxious. Uses Ativan as needed for symptoms. Denies any fever, chills. No headache, no vision changes. Notes she is concerned about infection or abnormal labs. Not have a primary care doctor that she visits with frequently. Past Medical History:   Diagnosis Date    Anxiety     Chronic UTI (urinary tract infection)     GERD (gastroesophageal reflux disease)     Hormone replacement therapy     Hyperlipidemia, mixed        Past Surgical History:   Procedure Laterality Date    HX COLONOSCOPY  10/31/2018    HX ENDOSCOPY  10/31/2018    HX HYSTERECTOMY Bilateral     at age 33, total hysterectomy     HX LAP CHOLECYSTECTOMY  12/04/2019    HX SALPINGO-OOPHORECTOMY      age 33. Family History:   Family history unknown: Yes       Social History     Socioeconomic History    Marital status:      Spouse name: Not on file    Number of children: Not on file    Years of education: Not on file    Highest education level: Not on file   Occupational History    Not on file   Social Needs    Financial resource strain: Not on file    Food insecurity:     Worry: Not on file     Inability: Not on file    Transportation needs:     Medical: Not on file     Non-medical: Not on file   Tobacco Use    Smoking status: Never Smoker    Smokeless tobacco: Never Used   Substance and Sexual Activity    Alcohol use:  Yes     Alcohol/week: 2.0 standard drinks     Types: 2 Glasses of wine per week     Frequency: Never    Drug use: No    Sexual activity: Yes     Partners: Male   Lifestyle    Physical activity:     Days per week: Not on file     Minutes per session: Not on file    Stress: Not on file   Relationships    Social connections:     Talks on phone: Not on file     Gets together: Not on file     Attends Gnosticism service: Not on file     Active member of club or organization: Not on file     Attends meetings of clubs or organizations: Not on file     Relationship status: Not on file    Intimate partner violence:     Fear of current or ex partner: Not on file     Emotionally abused: Not on file     Physically abused: Not on file     Forced sexual activity: Not on file   Other Topics Concern    Not on file   Social History Narrative    Not on file         ALLERGIES: Patient has no known allergies. Review of Systems   Constitutional: Positive for chills. Negative for fever. HENT: Negative for drooling and nosebleeds. Eyes: Negative for pain and itching. Respiratory: Negative for choking and stridor. Cardiovascular: Negative for leg swelling. Gastrointestinal: Negative for abdominal distention and rectal pain. Endocrine: Negative for heat intolerance and polyphagia. Genitourinary: Negative for enuresis and genital sores. Musculoskeletal: Negative for arthralgias and joint swelling. Skin: Negative for color change. Allergic/Immunologic: Negative for immunocompromised state. Neurological: Positive for light-headedness. Negative for tremors and speech difficulty. Hematological: Negative for adenopathy. Psychiatric/Behavioral: Negative for dysphoric mood and sleep disturbance. Vitals:    12/24/19 1323 12/24/19 1400 12/24/19 1430 12/24/19 1500   BP: 131/71 121/74 108/66 114/59   Pulse: (!) 102 91 89 86   Resp: 18 15 15 16   Temp: 97.9 °F (36.6 °C)      SpO2: 100% 97% 99% 98%   Weight: 67.5 kg (148 lb 13 oz)      Height: 5' 5\" (1.651 m)               Physical Exam  Vitals signs and nursing note reviewed. Constitutional:       Appearance: She is well-developed. HENT:      Head: Normocephalic.       Nose: Nose normal.   Eyes:      Conjunctiva/sclera: Conjunctivae normal.   Neck:      Musculoskeletal: Normal range of motion and neck supple. Cardiovascular:      Rate and Rhythm: Normal rate and regular rhythm. Heart sounds: Normal heart sounds. Pulmonary:      Effort: Pulmonary effort is normal. No respiratory distress. Breath sounds: Normal breath sounds. No wheezing. Abdominal:      General: There is no distension. Palpations: Abdomen is soft. Tenderness: There is no tenderness. Comments: Well healing trocar incisions above umbilicus and in RUQ. Musculoskeletal: Normal range of motion. General: No deformity. Skin:     General: Skin is warm and dry. Neurological:      Mental Status: She is alert. Cranial Nerves: No cranial nerve deficit. Sensory: No sensory deficit. Motor: No weakness. Coordination: Coordination normal.   Psychiatric:         Behavior: Behavior normal.          MDM  Number of Diagnoses or Management Options  Lightheadedness:   Diagnosis management comments: She is well-appearing, moist mucous membranes. No focal neurological deficits on exam.  No indication for CT head at this time. Labs within normal limits, no evidence of occult infection. Patient reports her anxiety has worsened since procedure. No indication for antibiotics at this time. Will need to establish care with primary care doctor for continued work-up. She remains well-appearing, stable for discharge at this time. ED EKG interpretation:  Rhythm: normal sinus rhythm; and regular . Rate (approx.): 87; Axis: normal; ST/T wave: normal; No evidence of acute coronary ischemia. Amount and/or Complexity of Data Reviewed  Decide to obtain previous medical records or to obtain history from someone other than the patient: yes           Procedures    Patient's results have been reviewed with them.   Patient and/or family have verbally conveyed their understanding and agreement of the patient's signs, symptoms, diagnosis, treatment and prognosis and additionally agree to follow up as recommended or return to the Emergency Room should their condition change prior to follow-up. Discharge instructions have also been provided to the patient with some educational information regarding their diagnosis as well a list of reasons why they would want to return to the ER prior to their follow-up appointment should their condition change.

## 2019-12-24 NOTE — ED TRIAGE NOTES
Triage: pt had gallbladder removed 12/4/19 with pancreatitis and since then has had dizziness. Pt reports first week after surgery continues to feel faint, palpitations, low blood sugar and fatigue. Denies fever, rectal bleeding, V/D. Pt is also having difficulty concentrating. Pt did have a f/u after surgery and was told to increase intake which pt has done.

## 2019-12-24 NOTE — DISCHARGE INSTRUCTIONS
Electrolytes were within normal limits. There was no evidence of infection in her urine or chest x-ray. Please follow-up with primary care doctor for further work-up. Thank you.

## 2019-12-26 LAB
ATRIAL RATE: 87 BPM
CALCULATED P AXIS, ECG09: 51 DEGREES
CALCULATED R AXIS, ECG10: 45 DEGREES
CALCULATED T AXIS, ECG11: 22 DEGREES
DIAGNOSIS, 93000: NORMAL
P-R INTERVAL, ECG05: 132 MS
Q-T INTERVAL, ECG07: 338 MS
QRS DURATION, ECG06: 72 MS
QTC CALCULATION (BEZET), ECG08: 406 MS
VENTRICULAR RATE, ECG03: 87 BPM

## 2020-09-21 ENCOUNTER — HOSPITAL ENCOUNTER (OUTPATIENT)
Dept: CT IMAGING | Age: 60
Discharge: HOME | End: 2020-09-21
Payer: COMMERCIAL

## 2020-09-21 DIAGNOSIS — R19.00 INTRA-ABDOMINAL AND PELVIC SWELLING, MASS AND LUMP, UNSPECIFIED SITE: ICD-10-CM

## 2020-09-21 PROCEDURE — 74177 CT ABD & PELVIS W/CONTRAST: CPT | Performed by: OBSTETRICS & GYNECOLOGY

## 2020-09-21 RX ORDER — SODIUM CHLORIDE 0.9 % (FLUSH) 0.9 %
10 SYRINGE (ML) INJECTION
Status: COMPLETED | OUTPATIENT
Start: 2020-09-21 | End: 2020-09-21

## 2020-09-21 RX ORDER — BARIUM SULFATE 20 MG/ML
900 SUSPENSION ORAL ONCE
Status: COMPLETED | OUTPATIENT
Start: 2020-09-21 | End: 2020-09-21

## 2020-09-21 RX ADMIN — BARIUM SULFATE 900 ML: 20 SUSPENSION ORAL at 08:47

## 2020-09-21 RX ADMIN — Medication 10 ML: at 08:47

## 2020-09-30 ENCOUNTER — OFFICE VISIT (OUTPATIENT)
Dept: GYNECOLOGY | Age: 60
End: 2020-09-30
Payer: COMMERCIAL

## 2020-09-30 VITALS
SYSTOLIC BLOOD PRESSURE: 123 MMHG | BODY MASS INDEX: 24.83 KG/M2 | DIASTOLIC BLOOD PRESSURE: 80 MMHG | HEART RATE: 106 BPM | WEIGHT: 149 LBS | HEIGHT: 65 IN

## 2020-09-30 DIAGNOSIS — K58.2 IRRITABLE BOWEL SYNDROME WITH BOTH CONSTIPATION AND DIARRHEA: Chronic | ICD-10-CM

## 2020-09-30 DIAGNOSIS — R10.30 LOWER ABDOMINAL PAIN: Primary | ICD-10-CM

## 2020-09-30 DIAGNOSIS — R19.00 PELVIC MASS: ICD-10-CM

## 2020-09-30 DIAGNOSIS — N95.2 ATROPHIC VAGINITIS: Chronic | ICD-10-CM

## 2020-09-30 PROCEDURE — 99205 OFFICE O/P NEW HI 60 MIN: CPT | Performed by: OBSTETRICS & GYNECOLOGY

## 2020-09-30 RX ORDER — ESTRADIOL 1 MG/1
1 TABLET ORAL DAILY
COMMUNITY

## 2020-09-30 RX ORDER — ESTRADIOL 10 UG/1
10 INSERT VAGINAL DAILY
COMMUNITY

## 2020-09-30 NOTE — PROGRESS NOTES
[address], [address], [address] [facility]  P [phone]  F [phone]    Office Note  Patient ID:  Name:  Sally Mittal  MRN:  [id number]  :  1960/60 y.o. Date:  10/5/2020      HISTORY OF PRESENT ILLNESS:  Ms. Sally Mittal is a 62 y.o.  postmenopausal female who presents in consultation from Dr. Emerson Herrera for multiple pelvic cysts. The patient has been followed by Dr. Emerson Herrera for urinary incontinence and recurrent UTI's. She has not required any surgical intervention except for a cystoscopy. She reports a history of at 33years of age. She also reports that she has had multiple cysts removed from her ovary, and reports both her ovaries were taken out in her 25s. She has been on HRT since that time and remains on HRT. She reports pelvic and abdominal pressure. She reports bloating and constant fluctuation between diarrhea and constipation, although mostly diarrhea. She reports up to 5 bowel movements daily. Underwent cholecystectomy in 2019 with Dr. Sirena Kay. She had pancreatitis at that time as well. She reports that since that time her bowel habits have been worse and are often thin and green. Denies hematochezia. Reports a family history of colon cancer. Reports her last colonoscopy was 2 years ago and normal. She is currently undergoing evaluation by GI for Celiac disease and other entities to work up her chronic diarrhea and irritable bowel symptoms. Denies outright pelvic pain. Denies nausea or vomiting. Denies change in appetite or bowel habits. The patient was also referred to pelvic PT by Dr. Charleen Rosado, which she started this week. Patient also reports that her mom used MARKO during her pregnancy. Pertinent PMH/PSH: prior hysterectomy and BSO, multiple abdominal surgeries, irritable bowel     Active, no restrictions.      Imaging Review:   CT A/P 2020:  FINDINGS:   LOWER THORAX: No significant abnormality in the incidentally imaged lower chest.  Bilateral breast implants are noted  LIVER: No mass. BILIARY TREE: Gallbladder has been removed. . CBD is not dilated. SPLEEN: within normal limits. PANCREAS: No mass or ductal dilatation. ADRENALS: Unremarkable. KIDNEYS: No mass, calculus, or hydronephrosis. STOMACH: Unremarkable. SMALL BOWEL: No dilatation or wall thickening. COLON: No dilatation or wall thickening. APPENDIX: Not identified, no inflammation  PERITONEUM: No ascites or pneumoperitoneum. RETROPERITONEUM: No lymphadenopathy or aortic aneurysm. REPRODUCTIVE ORGANS: The uterus has been removed. No ovarian masses are  detected. There continue to be several smoothly marginated well-defined cystic lesion with  imperceptible walls identified in the pelvis. The largest is in the left  hemipelvis measuring 3 x 2 cm. This is unchanged. However, there are new small cystic areas present. On image number 73 there are  3 new small cystic lesions just posterior to the vaginal cuff largest measures  14 mm. These were not present previously. More superiorly and posteriorly  adjacent to the rectum and rectosigmoid junction are cystic lesions that are  similar in appearance to the previous examination. There is no free fluid. There  is no pelvic adenopathy. URINARY BLADDER: No mass or calculus. BONES: No destructive bone lesion. ABDOMINAL WALL: No mass or hernia. ADDITIONAL COMMENTS: N/A  IMPRESSION  IMPRESSION:  1. The smoothly marginated nonenhancing cystic lesions in the pelvis are present  previously are noted and basically unchanged. However, there are at least 3 new  lesions present these project just posterior to the vaginal cuff. The largest of  the new lesions measures 14 mm. These measure fluid density and have a similar  appearance to the other lesions that are basically stable. Due to the  development of new lesions further evaluation is suggested. Direct visualization  or sampling is suggested.  Clinical correlation is suggested. ROS:  A comprehensive review of systems was negative except for that written in the History of Present Illness. , 10 point ROS    OB/GYN ROS:  Patient denies significant menstrual problems. ECOG ndGndrndanddndend:nd nd2nd Problem List:  Patient Active Problem List    Diagnosis Date Noted    Pelvic mass 09/30/2020    Abdominal pain 12/03/2019    Biliary acute pancreatitis 12/03/2019    Atrophic vaginitis 12/17/2018    Irritable bowel syndrome with both constipation and diarrhea 12/17/2018    Generalized anxiety disorder 11/01/2018     PMH:  Past Medical History:   Diagnosis Date    Anxiety     Chronic UTI (urinary tract infection)     GERD (gastroesophageal reflux disease)     Hormone replacement therapy     Hyperlipidemia, mixed     Ovarian cyst       PSH:  Past Surgical History:   Procedure Laterality Date    HX COLONOSCOPY  10/31/2018    HX ENDOSCOPY  10/31/2018    HX GYN      ovarian cyst removal X2    HX HYSTERECTOMY Bilateral     at age 33, total hysterectomy     HX LAP CHOLECYSTECTOMY  12/04/2019    HX SALPINGO-OOPHORECTOMY      age 33. Social History:  Social History     Tobacco Use    Smoking status: Never Smoker    Smokeless tobacco: Never Used   Substance Use Topics    Alcohol use: Yes     Alcohol/week: 2.0 standard drinks     Types: 2 Glasses of wine per week     Frequency: Never      Family History:  Family History   Problem Relation Age of Onset    Colon Cancer Paternal Grandmother       Medications: (reviewed)  Current Outpatient Medications   Medication Sig    estradioL (ESTRACE) 1 mg tablet Take 1 mg by mouth daily.  estradioL (VAGIFEM) 10 mcg tab vaginal tablet Insert 10 mcg into vagina daily.  citalopram (CELEXA) 10 mg tablet Take  by mouth daily.  esomeprazole (NEXIUM) 20 mg capsule Take  by mouth daily.  ondansetron (ZOFRAN ODT) 4 mg disintegrating tablet Take 1 Tab by mouth every eight (8) hours as needed for Nausea.     conjugated estrogens (PREMARIN) 0.625 mg/gram vaginal cream Apply 0.5 g to affected area daily.  estradiol (ESTRACE) 2 mg tablet Take  by mouth daily. No current facility-administered medications for this visit. Allergies: (reviewed)  No Known Allergies     OBJECTIVE:    Physical Exam:  VITAL SIGNS: Vitals:    09/30/20 1325   BP: 123/80   Pulse: (!) 106   Weight: 149 lb (67.6 kg)   Height: 5' 5\" (1.651 m)     Body mass index is 24.79 kg/m². GENERAL SALENA: Conversant, alert, oriented. No acute distress. HEENT: HEENT. No thyroid enlargement. No JVD. Neck: Supple without restrictions. RESPIRATORY: Clear to auscultation and percussion to the bases. No CVAT. CARDIOVASC: RRR without murmur/rub. GASTROINT: soft, non-tender, without masses or organomegaly   MUSCULOSKEL: no joint tenderness, deformity or swelling       EXTREMITIES: extremities normal, atraumatic, no cyanosis or edema   PELVIC: Exam chaperoned by nurse. Normal appearing external genitalia. On speculum exam, the vagina is atrophic but normal appearing. On bimanual exam, the cervix and uterus are surgically absent. No evidence of adnexal masses or nodularity. I could not appreciate any of the fluid filled cysts, which suggests they may be small peritoneal inclusions cysts that move with their surrounding structures and change shape given water density of imaging. RECTAL: deferred   KETAN SURVEY: No suspicious lymphadenopathy or edema noted. NEURO: Grossly intact. No acute deficit.        Lab Date as available:    Lab Results   Component Value Date/Time    WBC 4.7 12/24/2019 01:48 PM    HGB 12.8 12/24/2019 01:48 PM    HCT 38.3 12/24/2019 01:48 PM    PLATELET 496 98/24/6493 01:48 PM    MCV 87.4 12/24/2019 01:48 PM     Lab Results   Component Value Date/Time    Sodium 140 12/24/2019 01:48 PM    Potassium 3.3 (L) 12/24/2019 01:48 PM    Chloride 102 12/24/2019 01:48 PM    CO2 31 12/24/2019 01:48 PM    Anion gap 7 12/24/2019 01:48 PM    Glucose 94 12/24/2019 01:48 PM    BUN 21 (H) 12/24/2019 01:48 PM    Creatinine 1.03 (H) 12/24/2019 01:48 PM    BUN/Creatinine ratio 20 12/24/2019 01:48 PM    GFR est AA >60 12/24/2019 01:48 PM    GFR est non-AA 55 (L) 12/24/2019 01:48 PM    Calcium 9.0 12/24/2019 01:48 PM         IMPRESSION/PLAN:    Ms. Syble Oppenheim is a 62 y.o. female with a working diagnosis of multiple pelvic mass (fluid filled cysts), pelvic pressure, and irritable bowel symptoms    Problems:     Patient Active Problem List    Diagnosis Date Noted    Pelvic mass 09/30/2020    Abdominal pain 12/03/2019    Biliary acute pancreatitis 12/03/2019    Atrophic vaginitis 12/17/2018    Irritable bowel syndrome with both constipation and diarrhea 12/17/2018    Generalized anxiety disorder 11/01/2018       I reviewed Ms. Amalia Waggoner's course to date, including her medical records, recent studies, physical exam, and review of symptoms. Counseled patient that her case is not 'cut and dry'. I believe she has a number of issues that may be contributing to her symptoms, and should be involved in her workup and treatment. Based on her CT imaging results over the last 2 years, she has had numerous cysts in the pelvis that are filled with water-density. Her ovaries were removed in her 25s, and her Ca-125 was reportedly normal last year when evaluated with Dr. Leslie Thakkar. Follow-up Ct A/P 9/21/2020 demonstrates stable appearance of pelvic fluid cysts, but with the addition of 3 smaller ones. I counseled patient that this most likely represent peritoneal inclusion cysts based on her CT results and pelvic exam today, but that I would like evaluate these a little more. While surgical resection or drainage of these are possible, I am not sure they are the culprit for the majority of her symptoms.  Her history of 5 bowel movements daily (mostly diarrhea), and her on-and-off again bloating are certainly concerning for a GI etiology to some of her symptoms related to pelvic pain/pressure. She is currently undergoing workup with [facility]. In regard to some of her other pelvic pressure/pain, she is to start pelvic physical therapy this week per Dr. Al Alvarez referral. Certainly on exam her pelvic floor is tense. It is reasonable that this may also be contributing to her symptoms. I have recommended obtaining a Ca-125 along with a pelvic MRI to further evaluate her pelvic cysts. Her abdomen was noted to be normal during a laparoscopic cholecystectomy in 12/2019. The patient is apprehensive about surgery, and I expressed my concerns that jumping to surgery right now has the potential not to help her symptoms at all. I would like to see the results of her GI workup and to see if pelvic PT helps with her symptoms. If her MRI is concerning or her Ca-125 is elevated, then I will push to move forward with surgery for diagnostic and possible therapeutic purposes. If her GI workup is negative and pelvic PT does not help, then I expressed to the patient and her  that it would be reasonable to proceed with surgery at that time. The patient will return to clinic to discuss the results of the aforementioned studies and workup. All questions and concerns were addressed with the patient and she is comfortable with the plan.      Defined Sensitive Document    >50% of total time allocated to visit dedicated to counseling, 60 minutes total.    Signed By: Alexandra Roberson MD     10/5/2020/1:46 PM

## 2020-09-30 NOTE — LETTER
10/5/20 Patient: Kaleigh Randhawa YOB: 1960 Date of Visit: 9/30/2020 Srinath Carr MD 
[address] [facility] [address] [place] [id number] VIA Facsimile: [phone] Anabel Luu MD 
[address] [facility] [address] [address] VIA In Basket Dear MD Anabel Maruqez MD, Thank you for referring Ms. Kaleigh Randhawa to [facility] for evaluation. My notes for this consultation are attached. If you have questions, please do not hesitate to call me. I look forward to following your patient along with you.  
 
 
Sincerely, 
 
Cande Gonzalez MD

## 2020-09-30 NOTE — PROGRESS NOTES
New Patient, Referred by Dr. Ramos Padilla for pelvic cyst, pt complains of left lower abdominal pain, some discomfort on the right side    1. Have you been to the ER, urgent care clinic since your last visit? Hospitalized since your last visit?  no    2. Have you seen or consulted any other health care providers outside of the [address] [place] [address] since your last visit? Include any pap smears or colon screening.    Yes, Dr. Ramos Padilla

## 2020-10-05 ENCOUNTER — HOSPITAL ENCOUNTER (OUTPATIENT)
Dept: MRI IMAGING | Age: 60
Discharge: HOME | End: 2020-10-05

## 2020-10-05 DIAGNOSIS — R10.30 LOWER ABDOMINAL PAIN: ICD-10-CM

## 2020-10-05 DIAGNOSIS — R19.00 PELVIC MASS: ICD-10-CM

## 2022-03-18 PROBLEM — K58.2 IRRITABLE BOWEL SYNDROME WITH BOTH CONSTIPATION AND DIARRHEA: Status: ACTIVE | Noted: 2018-12-17

## 2022-03-18 PROBLEM — K85.10 BILIARY ACUTE PANCREATITIS: Status: ACTIVE | Noted: 2019-12-03

## 2022-03-19 PROBLEM — F41.1 GENERALIZED ANXIETY DISORDER: Status: ACTIVE | Noted: 2018-11-01

## 2022-03-19 PROBLEM — R10.9 ABDOMINAL PAIN: Status: ACTIVE | Noted: 2019-12-03

## 2022-03-19 PROBLEM — N95.2 ATROPHIC VAGINITIS: Status: ACTIVE | Noted: 2018-12-17

## 2022-03-19 PROBLEM — R19.00 PELVIC MASS: Status: ACTIVE | Noted: 2020-09-30

## 2022-09-29 ENCOUNTER — APPOINTMENT (OUTPATIENT)
Dept: GENERAL RADIOLOGY | Age: 62
End: 2022-09-29
Attending: STUDENT IN AN ORGANIZED HEALTH CARE EDUCATION/TRAINING PROGRAM
Payer: COMMERCIAL

## 2022-09-29 ENCOUNTER — HOSPITAL ENCOUNTER (EMERGENCY)
Age: 62
Discharge: HOME | End: 2022-09-29
Attending: STUDENT IN AN ORGANIZED HEALTH CARE EDUCATION/TRAINING PROGRAM
Payer: COMMERCIAL

## 2022-09-29 VITALS
DIASTOLIC BLOOD PRESSURE: 72 MMHG | HEIGHT: 65 IN | TEMPERATURE: 98.1 F | OXYGEN SATURATION: 96 % | RESPIRATION RATE: 16 BRPM | BODY MASS INDEX: 25.42 KG/M2 | WEIGHT: 152.56 LBS | HEART RATE: 86 BPM | SYSTOLIC BLOOD PRESSURE: 118 MMHG

## 2022-09-29 DIAGNOSIS — K21.00 GASTROESOPHAGEAL REFLUX DISEASE WITH ESOPHAGITIS, UNSPECIFIED WHETHER HEMORRHAGE: Primary | ICD-10-CM

## 2022-09-29 LAB
ALBUMIN SERPL-MCNC: 3.7 G/DL (ref 3.5–5)
ALBUMIN/GLOB SERPL: 1 {RATIO} (ref 1.1–2.2)
ALP SERPL-CCNC: 97 U/L (ref 45–117)
ALT SERPL-CCNC: 34 U/L (ref 12–78)
ANION GAP SERPL CALC-SCNC: 6 MMOL/L (ref 5–15)
AST SERPL-CCNC: 34 U/L (ref 15–37)
BASOPHILS # BLD: 0 K/UL (ref 0–0.1)
BASOPHILS NFR BLD: 1 % (ref 0–1)
BILIRUB SERPL-MCNC: 0.2 MG/DL (ref 0.2–1)
BUN SERPL-MCNC: 16 MG/DL (ref 6–20)
BUN/CREAT SERPL: 17 (ref 12–20)
CALCIUM SERPL-MCNC: 9.3 MG/DL (ref 8.5–10.1)
CHLORIDE SERPL-SCNC: 102 MMOL/L (ref 97–108)
CO2 SERPL-SCNC: 32 MMOL/L (ref 21–32)
COVID-19 RAPID TEST, COVR: NOT DETECTED
CREAT SERPL-MCNC: 0.92 MG/DL (ref 0.55–1.02)
DIFFERENTIAL METHOD BLD: NORMAL
EOSINOPHIL # BLD: 0.1 K/UL (ref 0–0.4)
EOSINOPHIL NFR BLD: 2 % (ref 0–7)
ERYTHROCYTE [DISTWIDTH] IN BLOOD BY AUTOMATED COUNT: 11.8 % (ref 11.5–14.5)
FLUAV AG NPH QL IA: NEGATIVE
FLUBV AG NOSE QL IA: NEGATIVE
GLOBULIN SER CALC-MCNC: 3.7 G/DL (ref 2–4)
GLUCOSE SERPL-MCNC: 104 MG/DL (ref 65–100)
HCT VFR BLD AUTO: 38.3 % (ref 35–47)
HGB BLD-MCNC: 12.7 G/DL (ref 11.5–16)
IMM GRANULOCYTES # BLD AUTO: 0 K/UL (ref 0–0.04)
IMM GRANULOCYTES NFR BLD AUTO: 0 % (ref 0–0.5)
LYMPHOCYTES # BLD: 1.4 K/UL (ref 0.8–3.5)
LYMPHOCYTES NFR BLD: 32 % (ref 12–49)
MAGNESIUM SERPL-MCNC: 2.1 MG/DL (ref 1.6–2.4)
MCH RBC QN AUTO: 29.3 PG (ref 26–34)
MCHC RBC AUTO-ENTMCNC: 33.2 G/DL (ref 30–36.5)
MCV RBC AUTO: 88.5 FL (ref 80–99)
MONOCYTES # BLD: 0.3 K/UL (ref 0–1)
MONOCYTES NFR BLD: 7 % (ref 5–13)
NEUTS SEG # BLD: 2.5 K/UL (ref 1.8–8)
NEUTS SEG NFR BLD: 58 % (ref 32–75)
NRBC # BLD: 0 K/UL (ref 0–0.01)
NRBC BLD-RTO: 0 PER 100 WBC
PLATELET # BLD AUTO: 301 K/UL (ref 150–400)
PMV BLD AUTO: 8.9 FL (ref 8.9–12.9)
POTASSIUM SERPL-SCNC: 3.8 MMOL/L (ref 3.5–5.1)
PROT SERPL-MCNC: 7.4 G/DL (ref 6.4–8.2)
RBC # BLD AUTO: 4.33 M/UL (ref 3.8–5.2)
SODIUM SERPL-SCNC: 140 MMOL/L (ref 136–145)
SOURCE, COVRS: NORMAL
TROPONIN-HIGH SENSITIVITY: 4 NG/L (ref 0–51)
WBC # BLD AUTO: 4.3 K/UL (ref 3.6–11)

## 2022-09-29 PROCEDURE — 74011250636 HC RX REV CODE- 250/636: Performed by: STUDENT IN AN ORGANIZED HEALTH CARE EDUCATION/TRAINING PROGRAM

## 2022-09-29 PROCEDURE — 84484 ASSAY OF TROPONIN QUANT: CPT

## 2022-09-29 PROCEDURE — 93005 ELECTROCARDIOGRAM TRACING: CPT

## 2022-09-29 PROCEDURE — 74011250637 HC RX REV CODE- 250/637: Performed by: STUDENT IN AN ORGANIZED HEALTH CARE EDUCATION/TRAINING PROGRAM

## 2022-09-29 PROCEDURE — C9113 INJ PANTOPRAZOLE SODIUM, VIA: HCPCS | Performed by: STUDENT IN AN ORGANIZED HEALTH CARE EDUCATION/TRAINING PROGRAM

## 2022-09-29 PROCEDURE — 74011000250 HC RX REV CODE- 250: Performed by: STUDENT IN AN ORGANIZED HEALTH CARE EDUCATION/TRAINING PROGRAM

## 2022-09-29 PROCEDURE — 83735 ASSAY OF MAGNESIUM: CPT

## 2022-09-29 PROCEDURE — 87804 INFLUENZA ASSAY W/OPTIC: CPT

## 2022-09-29 PROCEDURE — 96374 THER/PROPH/DIAG INJ IV PUSH: CPT

## 2022-09-29 PROCEDURE — 87635 SARS-COV-2 COVID-19 AMP PRB: CPT

## 2022-09-29 PROCEDURE — 71046 X-RAY EXAM CHEST 2 VIEWS: CPT

## 2022-09-29 PROCEDURE — 85025 COMPLETE CBC W/AUTO DIFF WBC: CPT

## 2022-09-29 PROCEDURE — 80053 COMPREHEN METABOLIC PANEL: CPT

## 2022-09-29 PROCEDURE — 36415 COLL VENOUS BLD VENIPUNCTURE: CPT

## 2022-09-29 PROCEDURE — 99285 EMERGENCY DEPT VISIT HI MDM: CPT

## 2022-09-29 RX ORDER — BENZONATATE 100 MG/1
100 CAPSULE ORAL
Status: DISCONTINUED | OUTPATIENT
Start: 2022-09-29 | End: 2022-09-29 | Stop reason: HOSPADM

## 2022-09-29 RX ORDER — ONDANSETRON 4 MG/1
4 TABLET, ORALLY DISINTEGRATING ORAL
Qty: 20 TABLET | Refills: 0 | Status: SHIPPED | OUTPATIENT
Start: 2022-09-29

## 2022-09-29 RX ORDER — FAMOTIDINE 20 MG/1
20 TABLET, FILM COATED ORAL
Qty: 30 TABLET | Refills: 0 | Status: SHIPPED | OUTPATIENT
Start: 2022-09-29 | End: 2022-10-29

## 2022-09-29 RX ORDER — OMEPRAZOLE 40 MG/1
40 CAPSULE, DELAYED RELEASE ORAL DAILY
Qty: 30 CAPSULE | Refills: 0 | Status: SHIPPED | OUTPATIENT
Start: 2022-09-29 | End: 2022-10-29

## 2022-09-29 RX ORDER — MAG HYDROX/ALUMINUM HYD/SIMETH 200-200-20
30 SUSPENSION, ORAL (FINAL DOSE FORM) ORAL
Status: COMPLETED | OUTPATIENT
Start: 2022-09-29 | End: 2022-09-29

## 2022-09-29 RX ORDER — FAMOTIDINE 10 MG/1
10 TABLET ORAL DAILY
COMMUNITY

## 2022-09-29 RX ADMIN — PANTOPRAZOLE SODIUM 80 MG: 40 INJECTION, POWDER, FOR SOLUTION INTRAVENOUS at 17:58

## 2022-09-29 RX ADMIN — Medication 30 ML: at 16:01

## 2022-09-29 NOTE — ED TRIAGE NOTES
Pt reports hx of acid reflux, however Nexium and Pepcid have not relieved her symptoms. In addition to epigastric/chest pain, over the past three weeks the patient reports nausea, sore throat, hoarse voice and difficulty swallowing like a foreign body is present in her throat. Pt reports sores to the back of her throat as well and states that she coughs from throat irritation.

## 2022-09-29 NOTE — ED NOTES
Pt discharged in stable condition at this time. MD/SALENA reviewed discharge instructions, prescriptions, and follow up with patient at bedside. Pt verbalized understanding and denies any needs or questions at this time.    Pt NAD on DC ambulatory with her  who will drive her home

## 2022-09-29 NOTE — DISCHARGE INSTRUCTIONS
You presented to the ED with symptoms or GERD. However based on your age and location of symptoms a work-up was done to look for heart problems. Your EKG, chest x-ray and troponin within normal limits. Doubt heart attack or acute coronary syndrome at this time. Most likely you are experiencing worsening symptoms of your GERD. I contacted someone from your GI group and they can attempt to get you an earlier appointment. However until that time take 40 mg of omeprazole daily and famotidine at night.

## 2022-09-30 LAB
ATRIAL RATE: 97 BPM
CALCULATED P AXIS, ECG09: 59 DEGREES
CALCULATED R AXIS, ECG10: 62 DEGREES
CALCULATED T AXIS, ECG11: 32 DEGREES
DIAGNOSIS, 93000: NORMAL
P-R INTERVAL, ECG05: 132 MS
Q-T INTERVAL, ECG07: 332 MS
QRS DURATION, ECG06: 72 MS
QTC CALCULATION (BEZET), ECG08: 421 MS
VENTRICULAR RATE, ECG03: 97 BPM

## 2022-09-30 NOTE — ED PROVIDER NOTES
Patient is a 71-year-old female history of GERD on omeprazole presented to ED with 3 weeks of worsening GERD type symptoms with throat burning, burping, globus sensation, difficulty sleeping. Patient denies any shortness of breath or fevers. Patient attempted to call her GI team but they are unable to see her until December. She called again today with no appointments available and therefore presented to the ED because of significant worsening symptoms that are uncontrolled with current management. The history is provided by the patient and medical records. Epigastric Pain   This is a recurrent problem. The current episode started more than 1 week ago. The problem occurs constantly. The problem has been gradually worsening. The pain is associated with an unknown factor. The pain is located in the epigastric region. The quality of the pain is sharp and burning. The pain is at a severity of 5/10. The pain is moderate. Associated symptoms include nausea. Pertinent negatives include no diarrhea, no vomiting, no constipation and no dysuria. The pain is worsened by eating and certain positions. The pain is relieved by Nothing. Past workup includes esophagogastroduodenoscopy. Her past medical history is significant for GERD. The patient's surgical history includes cholecystectomy. Past Medical History:   Diagnosis Date    Anxiety     Chronic UTI (urinary tract infection)     GERD (gastroesophageal reflux disease)     Hormone replacement therapy     Hyperlipidemia, mixed     Ovarian cyst        Past Surgical History:   Procedure Laterality Date    HX COLONOSCOPY  10/31/2018    HX ENDOSCOPY  10/31/2018    HX GYN      ovarian cyst removal X2    HX HYSTERECTOMY Bilateral     at age 33, total hysterectomy     HX LAP CHOLECYSTECTOMY  12/04/2019    HX SALPINGO-OOPHORECTOMY      age 33.           Family History:   Problem Relation Age of Onset    Colon Cancer Paternal Grandmother        Social History     Socioeconomic History    Marital status:      Spouse name: Not on file    Number of children: Not on file    Years of education: Not on file    Highest education level: Not on file   Occupational History    Not on file   Tobacco Use    Smoking status: Never    Smokeless tobacco: Never   Substance and Sexual Activity    Alcohol use: Yes     Alcohol/week: 2.0 standard drinks     Types: 2 Glasses of wine per week     Comment: weekends    Drug use: No    Sexual activity: Yes     Partners: Male   Other Topics Concern    Not on file   Social History Narrative    Not on file     Social Determinants of Health     Financial Resource Strain: Not on file   Food Insecurity: Not on file   Transportation Needs: Not on file   Physical Activity: Not on file   Stress: Not on file   Social Connections: Not on file   Intimate Partner Violence: Not on file   Housing Stability: Not on file         ALLERGIES: Patient has no known allergies. Review of Systems   Constitutional: Negative. HENT:  Positive for sore throat and trouble swallowing. Eyes: Negative. Respiratory: Negative. Cardiovascular: Negative. Gastrointestinal:  Positive for nausea. Negative for constipation, diarrhea and vomiting. Endocrine: Negative. Genitourinary: Negative. Negative for dysuria. Musculoskeletal: Negative. Skin: Negative. Allergic/Immunologic: Negative. Neurological: Negative. Hematological: Negative. Psychiatric/Behavioral: Negative. Vitals:    09/29/22 1628 09/29/22 1813 09/29/22 1815 09/29/22 1828   BP: 102/60 111/69 118/72    Pulse:    86   Resp:       Temp:       SpO2: 95%  96%    Weight:       Height:                Physical Exam  Vitals and nursing note reviewed. Constitutional:       General: She is not in acute distress. Appearance: Normal appearance. HENT:      Head: Normocephalic and atraumatic.       Right Ear: External ear normal.      Left Ear: External ear normal.      Nose: Nose normal. Mouth/Throat:      Lips: Pink. Mouth: Mucous membranes are moist. No injury or oral lesions. Palate: No lesions. Pharynx: No pharyngeal swelling, oropharyngeal exudate or posterior oropharyngeal erythema. Tonsils: No tonsillar exudate. Eyes:      Extraocular Movements: Extraocular movements intact. Conjunctiva/sclera: Conjunctivae normal.   Cardiovascular:      Rate and Rhythm: Normal rate. Pulses: Normal pulses. Radial pulses are 2+ on the right side and 2+ on the left side. Heart sounds: Normal heart sounds. Pulmonary:      Effort: Pulmonary effort is normal.      Breath sounds: Normal breath sounds. Abdominal:      General: Abdomen is flat. There is no distension. Tenderness: There is no abdominal tenderness. Musculoskeletal:         General: No deformity or signs of injury. Normal range of motion. Cervical back: Normal range of motion and neck supple. No tenderness. Skin:     General: Skin is warm and dry. Capillary Refill: Capillary refill takes less than 2 seconds. Neurological:      General: No focal deficit present. Mental Status: She is alert and oriented to person, place, and time. Psychiatric:         Attention and Perception: Attention normal.         Mood and Affect: Mood normal.         Behavior: Behavior normal.        [facility]    ED Course as of 09/29/22 2156   Thu Sep 29, 2022   1528 EKG interpretation:   Rhythm: normal sinus rhythm; and regular . Rate (approx.): 97; Axis: normal; Intervals: normal ; ST/T wave: non-specific changes; EKG documented and interpreted by Jennifer Dave.  Roseanne Devlin MD, Emergency Medicine.   [AL]      ED Course User Index  [AL] Cherylene Grill., MD     LABORATORY RESULTS:  Labs Reviewed   METABOLIC PANEL, COMPREHENSIVE - Abnormal; Notable for the following components:       Result Value    Glucose 104 (*)     A-G Ratio 1.0 (*)     All other components within normal limits   COVID-19 RAPID TEST   TROPONIN-HIGH SENSITIVITY   CBC WITH AUTOMATED DIFF   MAGNESIUM   INFLUENZA A+B VIRAL AGS       IMAGING RESULTS:  XR CHEST PA LAT   Final Result   No acute process             MEDICATIONS GIVEN:  Medications   alum-mag hydroxide-simeth (MYLANTA) oral suspension 30 mL (30 mL Oral Given 9/29/22 1601)   pantoprazole (PROTONIX) 80 mg in 0.9% sodium chloride 20 mL injection (80 mg IntraVENous Given 9/29/22 8951)       Differential diagnosis: GERD, ACS, atypical chest pain, esophagitis, thrush    ED physician interpretation of imaging: Chest x-ray without focal pneumonia  ED physician interpretation of EKG: No STEMI. See my interpretation EKG in ED course above. ED physician interpretation of laboratory results: Lab work with negative COVID, negative flu swab. Troponin normal limits doubt ACS. No electrolyte abnormalities, no leukocytosis. MDM: Patient is a 73-year-old female history of GERD presented to ED with 3 weeks of worsening symptoms not improving with her usual omeprazole. Based on patient's age and pain location ACS work-up was also conducted. No concerning findings. Most likely patient is experiencing acute worsening of her GERD. GI was contacted to establish follow-up. They will contact the office and get her an appointment earlier. They also recommended increasing omeprazole to 40 mg daily and famotidine at night. Patient comfortable with this plan. Patient's pain somewhat improved with Mylanta. Additionally IV Protonix given due to pain. Prescriptions written. Additionally patient has intermittent nausea and therefore prescription for Zofran was sent to her pharmacy. Further personalized recommendations for outpatient care as below. Key discharge instructions and summary of care: You presented to the ED with symptoms or GERD. However based on your age and location of symptoms a work-up was done to look for heart problems. Your EKG, chest x-ray and troponin within normal limits.   Doubt heart attack or acute coronary syndrome at this time. Most likely you are experiencing worsening symptoms of your GERD. I contacted someone from your GI group and they can attempt to get you an earlier appointment. However until that time take 40 mg of omeprazole daily and famotidine at night. The patient has been re-evaluated and feeling better. Patient is stable for discharge. All available radiology and laboratory results have been reviewed with patient and/or available family. Patient and/or family verbally conveyed their understanding and agreement of the patient's signs, symptoms, diagnosis, treatment and prognosis and additionally agree to follow-up as recommended in the discharge instructions or to return to the Emergency Department should their condition change or worsen prior to their follow-up appointment. All questions have been answered and patient and/or available family express understanding. IMPRESSION:  1. Gastroesophageal reflux disease with esophagitis, unspecified whether hemorrhage        DISPOSITION: Discharged     Neeraj Connor MD      Procedures

## 2023-02-11 ENCOUNTER — HOSPITAL ENCOUNTER (EMERGENCY)
Age: 63
Discharge: HOME | End: 2023-02-11
Attending: STUDENT IN AN ORGANIZED HEALTH CARE EDUCATION/TRAINING PROGRAM
Payer: COMMERCIAL

## 2023-02-11 VITALS
SYSTOLIC BLOOD PRESSURE: 110 MMHG | TEMPERATURE: 98.8 F | RESPIRATION RATE: 16 BRPM | OXYGEN SATURATION: 98 % | HEART RATE: 88 BPM | DIASTOLIC BLOOD PRESSURE: 60 MMHG

## 2023-02-11 DIAGNOSIS — L72.3 SEBACEOUS CYST: Primary | ICD-10-CM

## 2023-02-11 PROCEDURE — 90714 TD VACC NO PRESV 7 YRS+ IM: CPT | Performed by: FAMILY MEDICINE

## 2023-02-11 PROCEDURE — 99284 EMERGENCY DEPT VISIT MOD MDM: CPT

## 2023-02-11 PROCEDURE — 75810000289 HC I&D ABSCESS SIMP/COMP/MULT

## 2023-02-11 PROCEDURE — 90471 IMMUNIZATION ADMIN: CPT

## 2023-02-11 PROCEDURE — 74011000250 HC RX REV CODE- 250: Performed by: FAMILY MEDICINE

## 2023-02-11 PROCEDURE — 74011250636 HC RX REV CODE- 250/636: Performed by: FAMILY MEDICINE

## 2023-02-11 RX ORDER — CEPHALEXIN 500 MG/1
500 CAPSULE ORAL 4 TIMES DAILY
Qty: 28 CAPSULE | Refills: 0 | Status: SHIPPED | OUTPATIENT
Start: 2023-02-11 | End: 2023-02-18

## 2023-02-11 RX ORDER — LIDOCAINE HYDROCHLORIDE 10 MG/ML
10 INJECTION, SOLUTION EPIDURAL; INFILTRATION; INTRACAUDAL; PERINEURAL ONCE
Status: COMPLETED | OUTPATIENT
Start: 2023-02-11 | End: 2023-02-11

## 2023-02-11 RX ADMIN — CLOSTRIDIUM TETANI TOXOID ANTIGEN (FORMALDEHYDE INACTIVATED) AND CORYNEBACTERIUM DIPHTHERIAE TOXOID ANTIGEN (FORMALDEHYDE INACTIVATED) 0.5 ML: 5; 2 INJECTION, SUSPENSION INTRAMUSCULAR at 15:36

## 2023-02-11 RX ADMIN — LIDOCAINE HYDROCHLORIDE 10 ML: 10 INJECTION, SOLUTION EPIDURAL; INFILTRATION; INTRACAUDAL; PERINEURAL at 15:38

## 2023-02-11 NOTE — DISCHARGE INSTRUCTIONS
Thank you for allowing us to provide you with medical care today. We realize that you have many choices for your emergency care needs. We thank you for choosing [facility]. Please choose us in the future for any continued health care needs. The exam and treatment you received in the emergency department were for an emergent problem and are not intended as complete care. It is important that you follow-up with a doctor. If your symptoms worsen or you do not improve you should return to the emergency department. We are available 24 hours a day. Please make an appointment with your health care provider for follow-up of your emergency department visit. Take this sheet with you when you go to your follow-up visit.

## 2023-02-11 NOTE — ED PROVIDER NOTES
Patient is a 59-year-old female with past medical history of anxiety, GERD, hyperlipidemia presenting for evaluation of abscess or cyst.  Notes that is localized to her left chest wall. Has been present for several months, but has increased in size and discomfort over the past week. Has not had any drainage from the area. No surrounding erythema or heat. No fevers at home. No other complaints. Past Medical History:   Diagnosis Date    Anxiety     Chronic UTI (urinary tract infection)     GERD (gastroesophageal reflux disease)     Hormone replacement therapy     Hyperlipidemia, mixed     Ovarian cyst        Past Surgical History:   Procedure Laterality Date    HX COLONOSCOPY  10/31/2018    HX ENDOSCOPY  10/31/2018    HX GYN      ovarian cyst removal X2    HX HYSTERECTOMY Bilateral     at age 33, total hysterectomy     HX LAP CHOLECYSTECTOMY  12/04/2019    HX SALPINGO-OOPHORECTOMY      age 33. Family History:   Problem Relation Age of Onset    Colon Cancer Paternal Grandmother        Social History     Socioeconomic History    Marital status:      Spouse name: Not on file    Number of children: Not on file    Years of education: Not on file    Highest education level: Not on file   Occupational History    Not on file   Tobacco Use    Smoking status: Never    Smokeless tobacco: Never   Substance and Sexual Activity    Alcohol use:  Yes     Alcohol/week: 2.0 standard drinks     Types: 2 Glasses of wine per week     Comment: weekends    Drug use: No    Sexual activity: Yes     Partners: Male   Other Topics Concern    Not on file   Social History Narrative    Not on file     Social Determinants of Health     Financial Resource Strain: Not on file   Food Insecurity: Not on file   Transportation Needs: Not on file   Physical Activity: Not on file   Stress: Not on file   Social Connections: Not on file   Intimate Partner Violence: Not on file   Housing Stability: Not on file         ALLERGIES: Patient has no known allergies. Review of Systems   Constitutional:  Negative for fever and unexpected weight change. HENT:  Negative for congestion. Eyes:  Negative for visual disturbance. Respiratory:  Negative for cough, chest tightness and shortness of breath. Cardiovascular:  Negative for chest pain and palpitations. Gastrointestinal:  Negative for abdominal pain, diarrhea, nausea and vomiting. Endocrine: Negative for polyuria. Genitourinary:  Negative for dysuria and flank pain. Musculoskeletal:  Negative for back pain. Skin:  Positive for color change. Allergic/Immunologic: Negative for immunocompromised state. Neurological:  Negative for dizziness and headaches. Hematological:  Negative for adenopathy. Psychiatric/Behavioral:  Negative for agitation. Vitals:    02/11/23 1424 02/11/23 1426   BP: (!) 133/93    Pulse: 88    Resp: 16    Temp:  98.8 °F (37.1 °C)   SpO2: 96%             Physical Exam  Vitals and nursing note reviewed. Constitutional:       General: She is not in acute distress. Appearance: Normal appearance. She is normal weight. HENT:      Head: Atraumatic. Eyes:      Conjunctiva/sclera: Conjunctivae normal.      Pupils: Pupils are equal, round, and reactive to light. Cardiovascular:      Rate and Rhythm: Normal rate. Pulmonary:      Effort: Pulmonary effort is normal. No respiratory distress. Chest:       Abdominal:      General: Abdomen is flat. Musculoskeletal:         General: Normal range of motion. Cervical back: Neck supple. Skin:     General: Skin is warm and dry. Capillary Refill: Capillary refill takes less than 2 seconds. Neurological:      General: No focal deficit present. Mental Status: She is alert and oriented to person, place, and time. Mental status is at baseline.    Psychiatric:         Mood and Affect: Mood normal.         Behavior: Behavior normal.        Medical Decision Making  Patient presenting with cyst.  Plan for bedside I&D. Her tetanus is unknown, will update today. 1611 -I&D today with successful drainage of cyst.  We will start patient on oral Keflex for coverage. Unable to remove underlying capsule. She may follow-up with general surgery for full removal.  Discussed my clinical impression(s), any labs and/or radiology results with the patient. I answered any questions and addressed any concerns. Discussed the importance of following up with their primary care physician and/or specialist(s). Discussed signs or symptoms that would warrant return back to the ER for further evaluation. The patient is agreeable with discharge. Risk  Prescription drug management. I&D Abcess Simple    Date/Time: 2/11/2023 4:12 PM  Performed by: René Rivera NP  Authorized by: René Rivera NP     Consent:     Consent obtained:  Verbal    Consent given by:  Patient    Risks discussed:  Bleeding, incomplete drainage, infection and damage to other organs    Alternatives discussed:  No treatment, observation and referral  Universal protocol:     Procedure explained and questions answered to patient or proxy's satisfaction: yes      Patient identity confirmed:  Verbally with patient and arm band  Location:     Type:  Cyst    Size:  2    Location:  Trunk    Trunk location:  Chest  Pre-procedure details:     Skin preparation:  Povidone-iodine  Sedation:     Sedation type:  None  Anesthesia:     Anesthesia method:  Local infiltration    Local anesthetic:  Lidocaine 1% w/o epi  Procedure type:     Complexity:  Simple  Procedure details:     Incision types:  Single straight    Incision depth:  Subcutaneous    Drainage:  Bloody and purulent    Drainage amount:   Moderate    Wound treatment:  Wound left open    Packing materials:  None  Post-procedure details:     Procedure completion:  Tolerated well, no immediate complications

## 2023-02-11 NOTE — ED NOTES
Presented to the ED with an a nickel sized abscess under the right breast. Red, raised with defined center. Progressively worsened over the past few weeks, but has been there for months. Discussed with GYN who chose not to I&D.

## 2023-05-22 RX ORDER — FAMOTIDINE 10 MG
10 TABLET ORAL DAILY
COMMUNITY

## 2023-05-22 RX ORDER — ESTRADIOL 1 MG/1
1 TABLET ORAL DAILY
COMMUNITY

## 2023-05-22 RX ORDER — CITALOPRAM 10 MG/1
TABLET ORAL DAILY
COMMUNITY

## 2023-05-22 RX ORDER — ONDANSETRON 4 MG/1
4 TABLET, ORALLY DISINTEGRATING ORAL EVERY 8 HOURS PRN
COMMUNITY
Start: 2022-09-29

## 2023-05-22 RX ORDER — ESTRADIOL 10 UG/1
10 INSERT VAGINAL DAILY
COMMUNITY

## 2025-06-03 ENCOUNTER — TRANSCRIBE ORDERS (OUTPATIENT)
Facility: HOSPITAL | Age: 65
End: 2025-06-03

## 2025-06-03 DIAGNOSIS — R93.41 ABNORMAL CT SCAN, BLADDER: Primary | ICD-10-CM

## 2025-06-03 DIAGNOSIS — R10.2 PELVIC PRESSURE IN FEMALE: ICD-10-CM

## 2025-06-18 ENCOUNTER — HOSPITAL ENCOUNTER (OUTPATIENT)
Facility: HOSPITAL | Age: 65
Discharge: HOME | End: 2025-06-21
Payer: COMMERCIAL

## 2025-06-18 DIAGNOSIS — R10.2 PELVIC PRESSURE IN FEMALE: ICD-10-CM

## 2025-06-18 DIAGNOSIS — R93.41 ABNORMAL CT SCAN, BLADDER: ICD-10-CM

## 2025-06-18 PROCEDURE — 6360000004 HC RX CONTRAST MEDICATION: Performed by: FAMILY MEDICINE

## 2025-06-18 PROCEDURE — 74177 CT ABD & PELVIS W/CONTRAST: CPT

## 2025-06-18 RX ORDER — IOPAMIDOL 755 MG/ML
100 INJECTION, SOLUTION INTRAVASCULAR
Status: COMPLETED | OUTPATIENT
Start: 2025-06-18 | End: 2025-06-18

## 2025-06-18 RX ADMIN — IOPAMIDOL 100 ML: 755 INJECTION, SOLUTION INTRAVENOUS at 13:24

## (undated) DEVICE — DISSECTOR ENDOSCP DIA5MM CRV MPLR CAUT ENDOPATH

## (undated) DEVICE — Device

## (undated) DEVICE — STRAP,POSITIONING,KNEE/BODY,FOAM,4X60": Brand: MEDLINE

## (undated) DEVICE — CANISTER, RIGID, 3000CC: Brand: MEDLINE INDUSTRIES, INC.

## (undated) DEVICE — APPLICATOR BNDG 1MM ADH PREMIERPRO EXOFIN

## (undated) DEVICE — E-Z CLEAN, PTFE COATED, ELECTROSURGICAL LAPAROSCOPIC ELECTRODE, L-HOOK, 33 CM., SINGLE-USE, FOR USE WITH HAND CONTROL PENCIL: Brand: MEGADYNE

## (undated) DEVICE — SYR 10ML LUER LOK 1/5ML GRAD --

## (undated) DEVICE — (D)PREP SKN CHLRAPRP APPL 26ML -- CONVERT TO ITEM 371833

## (undated) DEVICE — LAPAROSCOPIC TROCAR SLEEVE/SINGLE USE: Brand: KII® OPTICAL ACCESS SYSTEM

## (undated) DEVICE — SUTURE SZ 0 27IN 5/8 CIR UR-6  TAPER PT VIOLET ABSRB VICRYL J603H

## (undated) DEVICE — STERILE POLYISOPRENE POWDER-FREE SURGICAL GLOVES WITH EMOLLIENT COATING: Brand: PROTEXIS

## (undated) DEVICE — STOPCOCK IV 3W --

## (undated) DEVICE — GARMENT,MEDLINE,DVT,INT,CALF,MED, GEN2: Brand: MEDLINE

## (undated) DEVICE — TROCAR: Brand: KII® SLEEVE

## (undated) DEVICE — NEEDLE HYPO 22GA L1.5IN BLK S STL HUB POLYPR SHLD REG BVL

## (undated) DEVICE — SOLUTION IV 1000ML 0.9% SOD CHL

## (undated) DEVICE — SURGICAL PROCEDURE KIT GEN LAPAROSCOPY LF

## (undated) DEVICE — TUBING INSUF 0.3UM FLTR W/ LUERLOCK CONN

## (undated) DEVICE — SUTURE MCRYL SZ 4-0 L27IN ABSRB UD L19MM PS-2 1/2 CIR PRIM Y426H

## (undated) DEVICE — PMI OPERATIVE CHOLANGIOGRAM CATHETER; TUBING IS 76CM IN LENGTH, 16GA WITH A: Brand: PMI

## (undated) DEVICE — INFECTION CONTROL KIT SYS

## (undated) DEVICE — REM POLYHESIVE ADULT PATIENT RETURN ELECTRODE: Brand: VALLEYLAB

## (undated) DEVICE — APPLIER CLP M/L SHFT DIA5MM 15 LIG LIGAMAX 5

## (undated) DEVICE — TROCAR: Brand: KII® OPTICAL ACCESS SYSTEM

## (undated) DEVICE — BAG SPEC REM 224ML W4XL6IN DIA10MM 1 HND GYN DISP ENDOPCH